# Patient Record
Sex: FEMALE | Race: WHITE | Employment: FULL TIME | URBAN - METROPOLITAN AREA
[De-identification: names, ages, dates, MRNs, and addresses within clinical notes are randomized per-mention and may not be internally consistent; named-entity substitution may affect disease eponyms.]

---

## 2018-11-30 ENCOUNTER — HOSPITAL ENCOUNTER (EMERGENCY)
Age: 63
Discharge: HOME | End: 2018-11-30
Attending: EMERGENCY MEDICINE
Payer: COMMERCIAL

## 2018-11-30 ENCOUNTER — APPOINTMENT (OUTPATIENT)
Dept: CT IMAGING | Age: 63
End: 2018-11-30
Payer: COMMERCIAL

## 2018-11-30 VITALS
SYSTOLIC BLOOD PRESSURE: 165 MMHG | RESPIRATION RATE: 18 BRPM | OXYGEN SATURATION: 98 % | DIASTOLIC BLOOD PRESSURE: 80 MMHG | BODY MASS INDEX: 31.28 KG/M2 | HEIGHT: 62 IN | HEART RATE: 90 BPM | TEMPERATURE: 97.4 F | WEIGHT: 170 LBS

## 2018-11-30 DIAGNOSIS — N10 ACUTE PYELONEPHRITIS: Primary | ICD-10-CM

## 2018-11-30 LAB
A/G RATIO: 1.3 (ref 1.1–2.2)
ALBUMIN SERPL-MCNC: 4.2 G/DL (ref 3.4–5)
ALP BLD-CCNC: 85 U/L (ref 40–129)
ALT SERPL-CCNC: 16 U/L (ref 10–40)
ANION GAP SERPL CALCULATED.3IONS-SCNC: 10 MMOL/L (ref 3–16)
AST SERPL-CCNC: 21 U/L (ref 15–37)
BACTERIA: ABNORMAL /HPF
BASOPHILS ABSOLUTE: 0.1 K/UL (ref 0–0.2)
BASOPHILS RELATIVE PERCENT: 1 %
BILIRUB SERPL-MCNC: 0.4 MG/DL (ref 0–1)
BILIRUBIN URINE: NEGATIVE
BLOOD, URINE: ABNORMAL
BUN BLDV-MCNC: 20 MG/DL (ref 7–20)
CALCIUM SERPL-MCNC: 9.8 MG/DL (ref 8.3–10.6)
CHLORIDE BLD-SCNC: 99 MMOL/L (ref 99–110)
CLARITY: CLEAR
CO2: 27 MMOL/L (ref 21–32)
COLOR: YELLOW
CREAT SERPL-MCNC: 0.6 MG/DL (ref 0.6–1.2)
EOSINOPHILS ABSOLUTE: 0.1 K/UL (ref 0–0.6)
EOSINOPHILS RELATIVE PERCENT: 1 %
EPITHELIAL CELLS, UA: ABNORMAL /HPF
GFR AFRICAN AMERICAN: >60
GFR NON-AFRICAN AMERICAN: >60
GLOBULIN: 3.3 G/DL
GLUCOSE BLD-MCNC: 97 MG/DL (ref 70–99)
GLUCOSE URINE: NEGATIVE MG/DL
HCT VFR BLD CALC: 41.9 % (ref 36–48)
HEMOGLOBIN: 14.2 G/DL (ref 12–16)
KETONES, URINE: NEGATIVE MG/DL
LEUKOCYTE ESTERASE, URINE: NEGATIVE
LIPASE: 34 U/L (ref 13–60)
LYMPHOCYTES ABSOLUTE: 1.3 K/UL (ref 1–5.1)
LYMPHOCYTES RELATIVE PERCENT: 11.6 %
MCH RBC QN AUTO: 29.9 PG (ref 26–34)
MCHC RBC AUTO-ENTMCNC: 33.9 G/DL (ref 31–36)
MCV RBC AUTO: 88.2 FL (ref 80–100)
MICROSCOPIC EXAMINATION: YES
MONOCYTES ABSOLUTE: 0.7 K/UL (ref 0–1.3)
MONOCYTES RELATIVE PERCENT: 6.2 %
MUCUS: ABNORMAL /LPF
NEUTROPHILS ABSOLUTE: 8.7 K/UL (ref 1.7–7.7)
NEUTROPHILS RELATIVE PERCENT: 80.2 %
NITRITE, URINE: NEGATIVE
PDW BLD-RTO: 12.9 % (ref 12.4–15.4)
PH UA: 6
PLATELET # BLD: 270 K/UL (ref 135–450)
PMV BLD AUTO: 7.6 FL (ref 5–10.5)
POTASSIUM REFLEX MAGNESIUM: 4.3 MMOL/L (ref 3.5–5.1)
PROTEIN UA: NEGATIVE MG/DL
RBC # BLD: 4.76 M/UL (ref 4–5.2)
RBC UA: ABNORMAL /HPF (ref 0–2)
SODIUM BLD-SCNC: 136 MMOL/L (ref 136–145)
SPECIFIC GRAVITY UA: >=1.03
TOTAL PROTEIN: 7.5 G/DL (ref 6.4–8.2)
URINE REFLEX TO CULTURE: ABNORMAL
URINE TYPE: ABNORMAL
UROBILINOGEN, URINE: 0.2 E.U./DL
WBC # BLD: 10.8 K/UL (ref 4–11)
WBC UA: ABNORMAL /HPF (ref 0–5)

## 2018-11-30 PROCEDURE — 99284 EMERGENCY DEPT VISIT MOD MDM: CPT

## 2018-11-30 PROCEDURE — 81001 URINALYSIS AUTO W/SCOPE: CPT

## 2018-11-30 PROCEDURE — 74176 CT ABD & PELVIS W/O CONTRAST: CPT

## 2018-11-30 PROCEDURE — 83690 ASSAY OF LIPASE: CPT

## 2018-11-30 PROCEDURE — 80053 COMPREHEN METABOLIC PANEL: CPT

## 2018-11-30 PROCEDURE — 85025 COMPLETE CBC W/AUTO DIFF WBC: CPT

## 2018-11-30 RX ORDER — PHENAZOPYRIDINE HYDROCHLORIDE 200 MG/1
200 TABLET, FILM COATED ORAL 3 TIMES DAILY PRN
Qty: 6 TABLET | Refills: 0 | Status: SHIPPED | OUTPATIENT
Start: 2018-11-30 | End: 2018-12-03

## 2018-11-30 ASSESSMENT — PAIN DESCRIPTION - LOCATION: LOCATION: BACK

## 2018-11-30 ASSESSMENT — PAIN DESCRIPTION - FREQUENCY: FREQUENCY: CONTINUOUS

## 2018-11-30 ASSESSMENT — PAIN SCALES - GENERAL: PAINLEVEL_OUTOF10: 7

## 2018-11-30 ASSESSMENT — PAIN DESCRIPTION - PAIN TYPE: TYPE: ACUTE PAIN

## 2018-11-30 ASSESSMENT — PAIN DESCRIPTION - DESCRIPTORS: DESCRIPTORS: SHARP

## 2018-11-30 ASSESSMENT — PAIN DESCRIPTION - ONSET: ONSET: ON-GOING

## 2018-11-30 ASSESSMENT — ENCOUNTER SYMPTOMS: GASTROINTESTINAL NEGATIVE: 1

## 2018-12-01 RX ORDER — LEVOFLOXACIN 750 MG/1
750 TABLET ORAL DAILY
Qty: 5 TABLET | Refills: 0 | Status: SHIPPED | OUTPATIENT
Start: 2018-12-01 | End: 2018-12-06

## 2018-12-01 NOTE — ED PROVIDER NOTES
[address] ED  eMERGENCY dEPARTMENT eNCOUnter        Pt Name: Karen Denise  MRN: [id number]  [place] 1955  Date of evaluation: 11/30/2018  Provider: Jax Campos PA-C  PCP: No primary care provider on file. This patient was seen and evaluated by the attending physician by Dr. Lorelei Garvin. CHIEF COMPLAINT       Chief Complaint   Patient presents with    Urinary Tract Infection     patient states that she has burning with urination and back pain that started yesturday evening. patient states that she was started on Augmentin yesturday and has taken 3 doses       HISTORY OF PRESENT ILLNESS   (Location/Symptom, Timing/Onset, Context/Setting, Quality, Duration, Modifying Factors, Severity)  Note limiting factors. Karen Denise is a 70 y.o. female presents with a several day history of dysuria and lower abdominal pain with some lower back pain as well. . Diagnosed with UTI and started on Augmentin several days ago however patient still complaining of dysuria and lower back pain. Onset couple of days ago. Duration symptoms have been persistent since onset. No aggravating or alleviating symptoms. Denies fevers or chills at this time. Nursing Notes were all reviewed and agreed with or any disagreements were addressed  in the HPI. REVIEW OF SYSTEMS    (2-9 systems for level 4, 10 or more for level 5)     Review of Systems   Constitutional: Negative. HENT: Negative. Cardiovascular: Negative. Gastrointestinal: Negative. Genitourinary: Positive for dysuria. Musculoskeletal: Negative. Neurological: Negative. Hematological: Negative. Positives and Pertinent negatives as per HPI. Except as noted abovein the ROS, all other systems were reviewed and negative. PAST MEDICAL HISTORY     Past Medical History:   Diagnosis Date    Hypertension          SURGICAL HISTORY   History reviewed. No pertinent surgical history.       [address] placed or performed during the hospital encounter of 11/30/18   Urinalysis Reflex to Culture   Result Value Ref Range    Color, UA Yellow Straw/Yellow    Clarity, UA Clear Clear    Glucose, Ur Negative Negative mg/dL    Bilirubin Urine Negative Negative    Ketones, Urine Negative Negative mg/dL    Specific Gravity, UA >=1.030 1.005 - 1.030    Blood, Urine SMALL (A) Negative    pH, UA 6.0 5.0 - 8.0    Protein, UA Negative Negative mg/dL    Urobilinogen, Urine 0.2 <2.0 E.U./dL    Nitrite, Urine Negative Negative    Leukocyte Esterase, Urine Negative Negative    Microscopic Examination YES     Urine Reflex to Culture Not Indicated     Urine Type Not Specified    CBC auto differential   Result Value Ref Range    WBC 10.8 4.0 - 11.0 K/uL    RBC 4.76 4.00 - 5.20 M/uL    Hemoglobin 14.2 12.0 - 16.0 g/dL    Hematocrit 41.9 36.0 - 48.0 %    MCV 88.2 80.0 - 100.0 fL    MCH 29.9 26.0 - 34.0 pg    MCHC 33.9 31.0 - 36.0 g/dL    RDW 12.9 12.4 - 15.4 %    Platelets 072 526 - 645 K/uL    MPV 7.6 5.0 - 10.5 fL    Neutrophils % 80.2 %    Lymphocytes % 11.6 %    Monocytes % 6.2 %    Eosinophils % 1.0 %    Basophils % 1.0 %    Neutrophils # 8.7 (H) 1.7 - 7.7 K/uL    Lymphocytes # 1.3 1.0 - 5.1 K/uL    Monocytes # 0.7 0.0 - 1.3 K/uL    Eosinophils # 0.1 0.0 - 0.6 K/uL    Basophils # 0.1 0.0 - 0.2 K/uL   Comprehensive Metabolic Panel w/ Reflex to MG   Result Value Ref Range    Sodium 136 136 - 145 mmol/L    Potassium reflex Magnesium 4.3 3.5 - 5.1 mmol/L    Chloride 99 99 - 110 mmol/L    CO2 27 21 - 32 mmol/L    Anion Gap 10 3 - 16    Glucose 97 70 - 99 mg/dL    BUN 20 7 - 20 mg/dL    CREATININE 0.6 0.6 - 1.2 mg/dL    GFR Non-African American >60 >60    GFR African American >60 >60    Calcium 9.8 8.3 - 10.6 mg/dL    Total Protein 7.5 6.4 - 8.2 g/dL    Alb 4.2 3.4 - 5.0 g/dL    Albumin/Globulin Ratio 1.3 1.1 - 2.2    Total Bilirubin 0.4 0.0 - 1.0 mg/dL    Alkaline Phosphatase 85 40 - 129 U/L    ALT 16 10 - 40 U/L    AST 21 15 - 37 U/L Globulin 3.3 g/dL   Lipase   Result Value Ref Range    Lipase 34.0 13.0 - 60.0 U/L   Microscopic Urinalysis   Result Value Ref Range    Mucus, UA 1+ (A) /LPF    WBC, UA 3-5 0 - 5 /HPF    RBC, UA 10-20 (A) 0 - 2 /HPF    Epi Cells 0-2 /HPF    Bacteria, UA Rare (A) /HPF         I estimate there is LOW risk for ACUTE APPENDICITIS, BOWEL OBSTRUCTION, CHOLECYSTITIS, DIVERTICULITIS, INCARCERATED HERNIA, PANCREATITIS, or PERFORATED BOWEL or ULCER, thus I consider the discharge disposition reasonable. Also, there is no evidence or peritonitis, sepsis, or toxicity. Erendira Lynch and I have discussed the diagnosis and risks, and we agree with discharging home to follow-up with their primary doctor. We also discussed returning to the Emergency Department immediately if new or worsening symptoms occur. We have discussed the symptoms which are most concerning (e.g., bloody stool, fever, changing or worsening pain, vomiting) that necessitate immediate return. FINAL Impression    1. Acute pyelonephritis        Blood pressure (!) 165/80, pulse 90, temperature 97.4 °F (36.3 °C), temperature source Temporal, resp. rate 18, height 5' 2\" (1.575 m), weight 170 lb (77.1 kg), SpO2 98 %. FINAL IMPRESSION      1.  Acute pyelonephritis          DISPOSITION/PLAN   DISPOSITION Decision To Discharge 11/30/2018 11:18:33 PM      PATIENT REFERREDTO:  [facility]) Pre-Services  [phone]          DISCHARGE MEDICATIONS:  Discharge Medication List as of 11/30/2018 11:19 PM      START taking these medications    Details   phenazopyridine (PYRIDIUM) 200 MG tablet Take 1 tablet by mouth 3 times daily as needed for Pain (bladder spasm/pain), Disp-6 tablet, R-0Print             DISCONTINUED MEDICATIONS:  Discharge Medication List as of 11/30/2018 11:19 PM                 (Please note that portions ofthis note were completed with a voice recognition program.  Efforts were made to edit the dictations but occasionally words are

## 2020-12-23 ENCOUNTER — APPOINTMENT (OUTPATIENT)
Dept: GENERAL RADIOLOGY | Age: 65
End: 2020-12-23
Payer: COMMERCIAL

## 2020-12-23 ENCOUNTER — HOSPITAL ENCOUNTER (EMERGENCY)
Age: 65
Discharge: HOME | End: 2020-12-23
Attending: EMERGENCY MEDICINE
Payer: COMMERCIAL

## 2020-12-23 VITALS
BODY MASS INDEX: 34.04 KG/M2 | DIASTOLIC BLOOD PRESSURE: 92 MMHG | TEMPERATURE: 98.3 F | HEART RATE: 74 BPM | SYSTOLIC BLOOD PRESSURE: 173 MMHG | WEIGHT: 185 LBS | RESPIRATION RATE: 16 BRPM | OXYGEN SATURATION: 98 % | HEIGHT: 62 IN

## 2020-12-23 PROCEDURE — 73130 X-RAY EXAM OF HAND: CPT

## 2020-12-23 PROCEDURE — 73090 X-RAY EXAM OF FOREARM: CPT

## 2020-12-23 PROCEDURE — 99284 EMERGENCY DEPT VISIT MOD MDM: CPT

## 2020-12-23 PROCEDURE — 73110 X-RAY EXAM OF WRIST: CPT

## 2020-12-23 RX ORDER — OXYCODONE HYDROCHLORIDE 5 MG/1
5 TABLET ORAL EVERY 6 HOURS PRN
Qty: 8 TABLET | Refills: 0 | Status: SHIPPED | OUTPATIENT
Start: 2020-12-23 | End: 2020-12-26

## 2020-12-23 ASSESSMENT — PAIN SCALES - GENERAL
PAINLEVEL_OUTOF10: 8
PAINLEVEL_OUTOF10: 8

## 2020-12-23 ASSESSMENT — PAIN DESCRIPTION - ORIENTATION: ORIENTATION: RIGHT

## 2020-12-23 ASSESSMENT — PAIN DESCRIPTION - PAIN TYPE: TYPE: ACUTE PAIN

## 2020-12-23 ASSESSMENT — PAIN DESCRIPTION - LOCATION: LOCATION: WRIST

## 2020-12-23 NOTE — ED PROVIDER NOTES
[facility]  ED PROVIDER NOTE    Patient Identification  Pt Name: Augusto Simons  MRN: [id number]  [place] 1955  Date of evaluation: 12/23/2020  Provider: Shannon Lewis MD  PCP: No primary care provider on file. Chief Complaint  Wrist Pain (patient states falling at work, c/o pain in right wrist. Ibuprofen 600mg PO taken before arrival. ) and Fall (states she slipped on the floor)      HPI  History provided by patient   This is a 77 y.o. female who presents to the ED for left wrist pain. Patient states that she slipped on the floor and fell out onto her arm. No pain anywhere but her right wrist.  Denies pain in her elbow, shoulder, chest, abdomen, head, neck, back, legs, left arm. No numbness or tingling. Pain is moderate intensity worse with movement. ROS  10 systems reviewed, pertinent positives/negatives per HPI otherwise noted to be negative. I have reviewed the following nursing documentation:  Allergies: Patient has no known allergies. Past medical history:   Past Medical History:   Diagnosis Date    Hypertension      Past surgical history: History reviewed. No pertinent surgical history. Home medications:   Previous Medications    METOPROLOL (LOPRESSOR) 50 MG TABLET    Take 50 mg by mouth 2 times daily. Social history:  reports that she quit smoking about 6 years ago. She has never used smokeless tobacco. She reports that she does not drink alcohol or use drugs. Family history:  History reviewed. No pertinent family history. Exam  ED Triage Vitals   BP Temp Temp src Pulse Resp SpO2 Height Weight   -- -- -- 12/23/20 0817 -- 12/23/20 0817 12/23/20 0832 12/23/20 0832      86  96 % 5' 2\" (1.575 m) 185 lb (83.9 kg)     Nursing note and vitals reviewed. Constitutional: In no acute distress  HENT:      Head: Normocephalic      Ears: External ears normal.      Nose: Nose normal.     Mouth: Membrane mucosa moist   Eyes: No discharge. Neck: Supple.  Trachea midline. Cardiovascular: Regular rate. Warm extremities  Pulmonary/Chest: Effort normal. No respiratory distress. CTAB. Abdominal: Soft. No distension. Nontender  : Deferred  Rectal: Deferred   Musculoskeletal: Moves all extremities. No gross deformity. No open wound. Right distal ulna pain. Normal capillary refill right hand. Normal sensation light palpation. Neurological: Alert and oriented. Face symmetric. Speech is clear. Skin: Warm and dry. No rash. Psychiatric: Normal mood and affect. Behavior is normal.    Procedures        Radiology  XR HAND RIGHT (MIN 3 VIEWS)   Final Result   RIGHT FOREARM:      Acute fractures of the distal right radius and ulna, as detailed on the wrist   impression. RIGHT WRIST:      Acute mildly displaced fracture of the distal right radius. There is also an   acute nondisplaced fracture of the ulnar styloid. RIGHT HAND:      No acute abnormality of the right hand. XR RADIUS ULNA RIGHT (2 VIEWS)   Final Result   RIGHT FOREARM:      Acute fractures of the distal right radius and ulna, as detailed on the wrist   impression. RIGHT WRIST:      Acute mildly displaced fracture of the distal right radius. There is also an   acute nondisplaced fracture of the ulnar styloid. RIGHT HAND:      No acute abnormality of the right hand. XR WRIST RIGHT (MIN 3 VIEWS)   Final Result   RIGHT FOREARM:      Acute fractures of the distal right radius and ulna, as detailed on the wrist   impression. RIGHT WRIST:      Acute mildly displaced fracture of the distal right radius. There is also an   acute nondisplaced fracture of the ulnar styloid. RIGHT HAND:      No acute abnormality of the right hand. Labs  No results found for this visit on 12/23/20.     Screenings   Chivo Coma Scale  Eye Opening: Spontaneous  Best Verbal Response: Oriented  Best Motor Response: Obeys commands  Chivo Coma Scale Score: 15       MDM and ED Course  Patient is a 13-year-old woman who presents with mechanical fall and right wrist pain. Will obtain x-rays. Neurovascularly intact at this time (EMP MDM)    Patient found to have radial and ulnar bone fracture. Splinted. Neurovascularly intact after splinting. Patient was reassessed. They are feeling well. Objectively they appear to be in no acute distress. Discharge instructions, follow up instructions, and return precautions were discussed with the patient and any available family. All questions were answered. [unfilled]    Final Impression  1. Closed fracture of right wrist, initial encounter        Blood pressure (!) 192/101, pulse 86, resp. rate 16, height 5' 2\" (1.575 m), weight 185 lb (83.9 kg), SpO2 95 %. Disposition:  DISPOSITION Discharge - Pending Orders Complete 12/23/2020 09:12:33 AM      Patient Referrals:  Ray Cohen MD  [id number]  [place] [address]  [phone]            Discharge Medications:  New Prescriptions    OXYCODONE (ROXICODONE) 5 MG IMMEDIATE RELEASE TABLET    Take 1 tablet by mouth every 6 hours as needed for Pain for up to 3 days. Intended supply: 3 days. Take lowest dose possible to manage pain       Discontinued Medications:  Discontinued Medications    No medications on file       This chart was generated using the [address] [place] [address] [address] dictation system. I created this record but it may contain dictation errors given the limitations of this technology.         Sarah Blackmon MD  12/23/20 0979

## 2020-12-23 NOTE — ED NOTES
Consent for ring removal obtained and witnessed by TONY Recinos.       Miguelito Au RN  12/23/20 7428

## 2020-12-23 NOTE — ED NOTES

## 2020-12-28 ENCOUNTER — OFFICE VISIT (OUTPATIENT)
Dept: ORTHOPEDIC SURGERY | Age: 65
End: 2020-12-28
Payer: COMMERCIAL

## 2020-12-28 VITALS — WEIGHT: 185 LBS | BODY MASS INDEX: 34.04 KG/M2 | HEIGHT: 62 IN

## 2020-12-28 PROBLEM — S52.501A CLOSED FRACTURE OF RIGHT DISTAL RADIUS: Status: ACTIVE | Noted: 2020-12-28

## 2020-12-28 PROCEDURE — 99203 OFFICE O/P NEW LOW 30 MIN: CPT | Performed by: ORTHOPAEDIC SURGERY

## 2020-12-28 PROCEDURE — 29125 APPL SHORT ARM SPLINT STATIC: CPT | Performed by: ORTHOPAEDIC SURGERY

## 2020-12-28 RX ORDER — OXYCODONE HYDROCHLORIDE AND ACETAMINOPHEN 5; 325 MG/1; MG/1
1 TABLET ORAL EVERY 8 HOURS PRN
Qty: 21 TABLET | Refills: 0 | Status: SHIPPED | OUTPATIENT
Start: 2020-12-28 | End: 2021-01-04

## 2020-12-28 NOTE — PROGRESS NOTES
Chief Complaint  Wrist Pain (right wrist INJURY FELL AT WORK ON 2020)      HPI:  The patient is a 77 y.o. 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 Patient slipped and fell at work, bracing her fall with her right wrist, she felt a pop and had immediate pain followed by swelling and bruising. she was seen for Emergency evaluation elsewhere where radiographs were obtained & she was appropriately immobilized. By report, there was not an associated skin injury. she reports moderate throbbing pain located in the dorsal area of the distal forearm & wrist, no tenderness throughout the hand or elbow. Pt does not have numbness in the hand. Medical History:  Past Medical History:   Diagnosis Date    Hypertension      No past surgical history on file. No family history on file.   Social History     Socioeconomic History    Marital status:      Spouse name: None    Number of children: None    Years of education: None    Highest education level: None   Occupational History    None   Social Needs    Financial resource strain: None    Food insecurity     Worry: None     Inability: None    Transportation needs     Medical: None     Non-medical: None   Tobacco Use    Smoking status: Former Smoker     Quit date: 2014     Years since quittin.0    Smokeless tobacco: Never Used   Substance and Sexual Activity    Alcohol use: No    Drug use: No    Sexual activity: None   Lifestyle    Physical activity     Days per week: None     Minutes per session: None    Stress: None   Relationships    Social connections     Talks on phone: None     Gets together: None     Attends Buddhism service: None     Active member of club or organization: None     Attends meetings of clubs or organizations: None     Relationship status: None    Intimate partner violence     Fear of current or ex partner: None Emotionally abused: None     Physically abused: None     Forced sexual activity: None   Other Topics Concern    None   Social History Narrative    None     Current Outpatient Medications   Medication Sig Dispense Refill    metoprolol (LOPRESSOR) 50 MG tablet Take 50 mg by mouth 2 times daily. No current facility-administered medications for this visit. No Known Allergies    ROS:  ROS neg except for positives in HPI    Physical Exam:   GEN/PSYCH: The patient is well nourished, oriented to person, place & time. The patient demonstrates appropriate mood and affect as well as normal gait and station. Pain level appropriate to injury. Skin: Skin color, texture, turgor normal. No rashes or lesions in the injured limb, normal on the contralateral side     Digits:   range of motion is satisfactory bilaterally, mildly diminished secondary to pain on affected side     right wrist:   Range of motion is limited by pain. Swelling is moderate in the wrist & digits. Maximal pain is elicited with palpation of proximal wrist with moderately tender distal ulna/ulnar styloid  Sensation is  subjectively normal in the entire hand. There is  clinical suggestion of mal-alignment of distal radius. No evidence of gross joint instability, excluding the immediate zone of injury, bilaterally. Muscular strength is clinically appropriate, limited by pain in the injured extremity. Skin intact and compartments soft. Volar ecchymosis noted    Contralateral wrist:  Full range of motion . No swelling. No pain to palpation of wrist.  Sensation is subjectively normal in the Whole Hand, no mal-alignment of distal radius.   No evidence of gross joint instability,  Muscular strength is clinically appropriate     Vascular examination: satisfactory bilaterally       Radiographic Evaluation:   Radiographs are reviewed today:  EXAMINATION:   TWO XRAY VIEWS OF THE RIGHT FOREARM; THREE XRAY VIEWS OF THE RIGHT HAND; THREE XRAY VIEWS OF THE RIGHT WRIST       12/23/2020 8:58 am       COMPARISON:   None.       HISTORY:   ORDERING SYSTEM PROVIDED HISTORY: right distal ulna pain   TECHNOLOGIST PROVIDED HISTORY:   Reason for exam:->right distal ulna pain       Patient fell at work, acute right wrist pain.       FINDINGS:   RIGHT FOREARM:       Frontal and lateral views of the right forearm were performed. Merrick Spatz are   acute fractures at the right wrist, as detailed below. Merrick Spatz is no   additional fracture of the shafts of the right radius or ulna.  The right   elbow joint appears preserved.  There is a small enthesophyte adjacent to the   lateral epicondyle of the distal right humerus.  No soft tissue abnormality   or radiopaque foreign body is evident.       RIGHT WRIST:       Three views of the right wrist were performed. Merrick Spatz is an acute displaced   fracture of the distal right radius, with mild dorsal displacement of the   distal fracture fragment.  There is also an acute nondisplaced ulnar styloid   avulsion fracture.  No additional fracture is identified.  There is joint   spaces are relatively preserved.  There is diffuse soft tissue swelling.       RIGHT HAND:       Three views of the right hand were performed. Merrick Spatz is no acute fracture or   dislocation.  There is mild multifocal osteoarthritis.  There is mild diffuse   soft tissue swelling of the right hand.  No radiopaque foreign body is   evident.           Impression   RIGHT FOREARM:       Acute fractures of the distal right radius and ulna, as detailed on the wrist   impression.       RIGHT WRIST:       Acute mildly displaced fracture of the distal right radius.  There is also an   acute nondisplaced fracture of the ulnar styloid.       RIGHT HAND:       No acute abnormality of the right hand.          Impression:   Right distal radius and ulna fracture    Plan: I talked at length with the patient regarding treatment options, both surgical and nonsurgical.  We talked about the implication of the fracture alignment and involvement and the chance of stiffness and post-traumatic arthritis. Patient agrees with the plan and all questions with the patient are answered. We discussed proceeding with surgical stabilization of the right distal radius with a volar plate by means of ORIF. The distal ulna can be treated nonoperatively. This would be an outpatient procedure under anesthesia and be done this week pending clearance and approval by [facility]. We will transition to a short splint today, allowing the elbow to be free. Nonweightbearing status, good elevation, icing. Pain medication was refilled. We will help arrange surgical scheduling for this. The risks and benefits of surgical fixation versus non-operative management were discussed thoroughly. These included, but were not limited to infection, tendon or nerve injury, stiffness or pain of the wrist joint long-term, malunion, nonunion, implant failure, tendon rupture, scar sensitivity, adverse effects of anesthesia (stroke or death), and possible need for hardware removal.    All questions and concerns were addressed today. Patient is in agreement with the plan. Abbi Painter MD  Hand & Upper Extremity Surgery  [address]  A partner of [facility] ([facility])        Please note that this transcription was created using voice recognition software. Any errors are unintentional and may be due to voice recognition transcription.

## 2020-12-29 ENCOUNTER — ANESTHESIA EVENT (OUTPATIENT)
Dept: OPERATING ROOM | Age: 65
End: 2020-12-29
Payer: COMMERCIAL

## 2020-12-29 ENCOUNTER — HOSPITAL ENCOUNTER (OUTPATIENT)
Age: 65
Discharge: HOME | End: 2020-12-29
Payer: COMMERCIAL

## 2020-12-29 LAB
ANION GAP SERPL CALCULATED.3IONS-SCNC: 8 MMOL/L (ref 3–16)
BASOPHILS ABSOLUTE: 0.1 K/UL (ref 0–0.2)
BASOPHILS RELATIVE PERCENT: 1.4 %
BUN BLDV-MCNC: 18 MG/DL (ref 7–20)
CALCIUM SERPL-MCNC: 9.7 MG/DL (ref 8.3–10.6)
CHLORIDE BLD-SCNC: 102 MMOL/L (ref 99–110)
CO2: 31 MMOL/L (ref 21–32)
CREAT SERPL-MCNC: 0.7 MG/DL (ref 0.6–1.2)
EOSINOPHILS ABSOLUTE: 0.2 K/UL (ref 0–0.6)
EOSINOPHILS RELATIVE PERCENT: 2.2 %
GFR AFRICAN AMERICAN: >60
GFR NON-AFRICAN AMERICAN: >60
GLUCOSE BLD-MCNC: 119 MG/DL (ref 70–99)
HCT VFR BLD CALC: 40.5 % (ref 36–48)
HEMOGLOBIN: 13.5 G/DL (ref 12–16)
LYMPHOCYTES ABSOLUTE: 2.2 K/UL (ref 1–5.1)
LYMPHOCYTES RELATIVE PERCENT: 22.8 %
MCH RBC QN AUTO: 29.4 PG (ref 26–34)
MCHC RBC AUTO-ENTMCNC: 33.4 G/DL (ref 31–36)
MCV RBC AUTO: 88.1 FL (ref 80–100)
MONOCYTES ABSOLUTE: 0.7 K/UL (ref 0–1.3)
MONOCYTES RELATIVE PERCENT: 7 %
NEUTROPHILS ABSOLUTE: 6.4 K/UL (ref 1.7–7.7)
NEUTROPHILS RELATIVE PERCENT: 66.6 %
PDW BLD-RTO: 13.1 % (ref 12.4–15.4)
PLATELET # BLD: 327 K/UL (ref 135–450)
PMV BLD AUTO: 6.6 FL (ref 5–10.5)
POTASSIUM SERPL-SCNC: 4.4 MMOL/L (ref 3.5–5.1)
RBC # BLD: 4.6 M/UL (ref 4–5.2)
SARS-COV-2, NAAT: NOT DETECTED
SODIUM BLD-SCNC: 141 MMOL/L (ref 136–145)
WBC # BLD: 9.6 K/UL (ref 4–11)

## 2020-12-29 PROCEDURE — 80048 BASIC METABOLIC PNL TOTAL CA: CPT

## 2020-12-29 PROCEDURE — U0002 COVID-19 LAB TEST NON-CDC: HCPCS

## 2020-12-29 PROCEDURE — 36415 COLL VENOUS BLD VENIPUNCTURE: CPT

## 2020-12-29 PROCEDURE — 85025 COMPLETE CBC W/AUTO DIFF WBC: CPT

## 2020-12-30 ENCOUNTER — HOSPITAL ENCOUNTER (OUTPATIENT)
Age: 65
Setting detail: OUTPATIENT SURGERY
Discharge: HOME | End: 2020-12-30
Attending: ORTHOPAEDIC SURGERY | Admitting: ORTHOPAEDIC SURGERY
Payer: COMMERCIAL

## 2020-12-30 ENCOUNTER — ANESTHESIA (OUTPATIENT)
Dept: OPERATING ROOM | Age: 65
End: 2020-12-30
Payer: COMMERCIAL

## 2020-12-30 VITALS
WEIGHT: 190 LBS | DIASTOLIC BLOOD PRESSURE: 53 MMHG | HEART RATE: 96 BPM | SYSTOLIC BLOOD PRESSURE: 157 MMHG | RESPIRATION RATE: 18 BRPM | OXYGEN SATURATION: 94 % | BODY MASS INDEX: 34.96 KG/M2 | HEIGHT: 62 IN | TEMPERATURE: 97.6 F

## 2020-12-30 VITALS — DIASTOLIC BLOOD PRESSURE: 61 MMHG | OXYGEN SATURATION: 91 % | SYSTOLIC BLOOD PRESSURE: 118 MMHG

## 2020-12-30 PROCEDURE — 7100000011 HC PHASE II RECOVERY - ADDTL 15 MIN: Performed by: ORTHOPAEDIC SURGERY

## 2020-12-30 PROCEDURE — 3600000004 HC SURGERY LEVEL 4 BASE: Performed by: ORTHOPAEDIC SURGERY

## 2020-12-30 PROCEDURE — 3700000000 HC ANESTHESIA ATTENDED CARE: Performed by: ORTHOPAEDIC SURGERY

## 2020-12-30 PROCEDURE — 2709999900 HC NON-CHARGEABLE SUPPLY: Performed by: ORTHOPAEDIC SURGERY

## 2020-12-30 PROCEDURE — 2500000003 HC RX 250 WO HCPCS: Performed by: NURSE ANESTHETIST, CERTIFIED REGISTERED

## 2020-12-30 PROCEDURE — 2580000003 HC RX 258: Performed by: ANESTHESIOLOGY

## 2020-12-30 PROCEDURE — C1713 ANCHOR/SCREW BN/BN,TIS/BN: HCPCS | Performed by: ORTHOPAEDIC SURGERY

## 2020-12-30 PROCEDURE — 6360000002 HC RX W HCPCS: Performed by: NURSE ANESTHETIST, CERTIFIED REGISTERED

## 2020-12-30 PROCEDURE — 2500000003 HC RX 250 WO HCPCS: Performed by: ANESTHESIOLOGY

## 2020-12-30 PROCEDURE — 3700000001 HC ADD 15 MINUTES (ANESTHESIA): Performed by: ORTHOPAEDIC SURGERY

## 2020-12-30 PROCEDURE — 7100000010 HC PHASE II RECOVERY - FIRST 15 MIN: Performed by: ORTHOPAEDIC SURGERY

## 2020-12-30 PROCEDURE — 6360000002 HC RX W HCPCS: Performed by: ORTHOPAEDIC SURGERY

## 2020-12-30 PROCEDURE — 64415 NJX AA&/STRD BRCH PLXS IMG: CPT | Performed by: ANESTHESIOLOGY

## 2020-12-30 PROCEDURE — 3600000014 HC SURGERY LEVEL 4 ADDTL 15MIN: Performed by: ORTHOPAEDIC SURGERY

## 2020-12-30 DEVICE — SCREW BNE L15MM DIA2.7MM DST RAD NONLOCKING FULL THRD SQ: Type: IMPLANTABLE DEVICE | Site: WRIST | Status: FUNCTIONAL

## 2020-12-30 DEVICE — SCREW BNE L14MM DIA2.7MM DST VOLAR RAD NONLOCKING FULL THRD: Type: IMPLANTABLE DEVICE | Site: WRIST | Status: FUNCTIONAL

## 2020-12-30 DEVICE — SCREW BNE L18MM DIA2.7MM DST VOLAR RAD MULTDIR FULL THRD SQ: Type: IMPLANTABLE DEVICE | Site: WRIST | Status: FUNCTIONAL

## 2020-12-30 DEVICE — PLATE BONE NONSTERILE RT VOLAR NAR CROSSLOCK DVR: Type: IMPLANTABLE DEVICE | Site: WRIST | Status: FUNCTIONAL

## 2020-12-30 DEVICE — SCREW BNE L18MM DIA2.7MM DST RAD LOK FULL THRD SQ DRV HD LO: Type: IMPLANTABLE DEVICE | Site: WRIST | Status: FUNCTIONAL

## 2020-12-30 DEVICE — IMPLANTABLE DEVICE: Type: IMPLANTABLE DEVICE | Site: WRIST | Status: FUNCTIONAL

## 2020-12-30 DEVICE — SCREW BNE L16MM DIA2.7MM CORT DST RAD LOK FULL THRD SQ DRV: Type: IMPLANTABLE DEVICE | Site: WRIST | Status: FUNCTIONAL

## 2020-12-30 DEVICE — SCREW BONE L15MM DIA2.7MM DSTL RAD LCK FULL THRD SQ DRV HD: Type: IMPLANTABLE DEVICE | Site: WRIST | Status: FUNCTIONAL

## 2020-12-30 DEVICE — SCREW BNE L16MM DIA2.7MM DST RAD LOK FULL THRD SQ DRV HD LO: Type: IMPLANTABLE DEVICE | Site: WRIST | Status: FUNCTIONAL

## 2020-12-30 RX ORDER — SODIUM CHLORIDE 0.9 % (FLUSH) 0.9 %
10 SYRINGE (ML) INJECTION PRN
Status: DISCONTINUED | OUTPATIENT
Start: 2020-12-30 | End: 2020-12-30 | Stop reason: HOSPADM

## 2020-12-30 RX ORDER — SODIUM CHLORIDE, SODIUM LACTATE, POTASSIUM CHLORIDE, CALCIUM CHLORIDE 600; 310; 30; 20 MG/100ML; MG/100ML; MG/100ML; MG/100ML
INJECTION, SOLUTION INTRAVENOUS CONTINUOUS
Status: DISCONTINUED | OUTPATIENT
Start: 2020-12-30 | End: 2020-12-30 | Stop reason: HOSPADM

## 2020-12-30 RX ORDER — PROPOFOL 10 MG/ML
INJECTION, EMULSION INTRAVENOUS PRN
Status: DISCONTINUED | OUTPATIENT
Start: 2020-12-30 | End: 2020-12-30 | Stop reason: SDUPTHER

## 2020-12-30 RX ORDER — OXYCODONE HYDROCHLORIDE AND ACETAMINOPHEN 5; 325 MG/1; MG/1
2 TABLET ORAL PRN
Status: DISCONTINUED | OUTPATIENT
Start: 2020-12-30 | End: 2020-12-30 | Stop reason: HOSPADM

## 2020-12-30 RX ORDER — SODIUM CHLORIDE 0.9 % (FLUSH) 0.9 %
10 SYRINGE (ML) INJECTION EVERY 12 HOURS SCHEDULED
Status: DISCONTINUED | OUTPATIENT
Start: 2020-12-30 | End: 2020-12-30 | Stop reason: HOSPADM

## 2020-12-30 RX ORDER — FENTANYL CITRATE 50 UG/ML
INJECTION, SOLUTION INTRAMUSCULAR; INTRAVENOUS PRN
Status: DISCONTINUED | OUTPATIENT
Start: 2020-12-30 | End: 2020-12-30 | Stop reason: SDUPTHER

## 2020-12-30 RX ORDER — HYDRALAZINE HYDROCHLORIDE 20 MG/ML
5 INJECTION INTRAMUSCULAR; INTRAVENOUS EVERY 30 MIN PRN
Status: DISCONTINUED | OUTPATIENT
Start: 2020-12-30 | End: 2020-12-30 | Stop reason: HOSPADM

## 2020-12-30 RX ORDER — ONDANSETRON 2 MG/ML
INJECTION INTRAMUSCULAR; INTRAVENOUS PRN
Status: DISCONTINUED | OUTPATIENT
Start: 2020-12-30 | End: 2020-12-30 | Stop reason: SDUPTHER

## 2020-12-30 RX ORDER — LABETALOL HYDROCHLORIDE 5 MG/ML
5 INJECTION, SOLUTION INTRAVENOUS
Status: DISCONTINUED | OUTPATIENT
Start: 2020-12-30 | End: 2020-12-30 | Stop reason: HOSPADM

## 2020-12-30 RX ORDER — LIDOCAINE HYDROCHLORIDE 20 MG/ML
INJECTION, SOLUTION INFILTRATION; PERINEURAL PRN
Status: DISCONTINUED | OUTPATIENT
Start: 2020-12-30 | End: 2020-12-30 | Stop reason: SDUPTHER

## 2020-12-30 RX ORDER — ONDANSETRON 2 MG/ML
4 INJECTION INTRAMUSCULAR; INTRAVENOUS EVERY 30 MIN PRN
Status: DISCONTINUED | OUTPATIENT
Start: 2020-12-30 | End: 2020-12-30 | Stop reason: HOSPADM

## 2020-12-30 RX ORDER — BUPIVACAINE HYDROCHLORIDE 5 MG/ML
INJECTION, SOLUTION EPIDURAL; INTRACAUDAL
Status: DISPENSED
Start: 2020-12-30 | End: 2020-12-31

## 2020-12-30 RX ORDER — DIPHENHYDRAMINE HYDROCHLORIDE 50 MG/ML
6.25 INJECTION INTRAMUSCULAR; INTRAVENOUS
Status: DISCONTINUED | OUTPATIENT
Start: 2020-12-30 | End: 2020-12-30 | Stop reason: HOSPADM

## 2020-12-30 RX ORDER — OXYCODONE HYDROCHLORIDE AND ACETAMINOPHEN 5; 325 MG/1; MG/1
1 TABLET ORAL PRN
Status: DISCONTINUED | OUTPATIENT
Start: 2020-12-30 | End: 2020-12-30 | Stop reason: HOSPADM

## 2020-12-30 RX ORDER — DEXAMETHASONE SODIUM PHOSPHATE 4 MG/ML
INJECTION, SOLUTION INTRA-ARTICULAR; INTRALESIONAL; INTRAMUSCULAR; INTRAVENOUS; SOFT TISSUE PRN
Status: DISCONTINUED | OUTPATIENT
Start: 2020-12-30 | End: 2020-12-30 | Stop reason: SDUPTHER

## 2020-12-30 RX ADMIN — ONDANSETRON 4 MG: 2 INJECTION, SOLUTION INTRAMUSCULAR; INTRAVENOUS at 16:39

## 2020-12-30 RX ADMIN — PROPOFOL 25 MG: 10 INJECTION, EMULSION INTRAVENOUS at 17:08

## 2020-12-30 RX ADMIN — FENTANYL CITRATE 25 MCG: 50 INJECTION INTRAMUSCULAR; INTRAVENOUS at 16:44

## 2020-12-30 RX ADMIN — FENTANYL CITRATE 25 MCG: 50 INJECTION INTRAMUSCULAR; INTRAVENOUS at 16:46

## 2020-12-30 RX ADMIN — SODIUM CHLORIDE, POTASSIUM CHLORIDE, SODIUM LACTATE AND CALCIUM CHLORIDE: 600; 310; 30; 20 INJECTION, SOLUTION INTRAVENOUS at 14:35

## 2020-12-30 RX ADMIN — SODIUM CHLORIDE, POTASSIUM CHLORIDE, SODIUM LACTATE AND CALCIUM CHLORIDE: 600; 310; 30; 20 INJECTION, SOLUTION INTRAVENOUS at 16:29

## 2020-12-30 RX ADMIN — PROPOFOL 50 MG: 10 INJECTION, EMULSION INTRAVENOUS at 16:53

## 2020-12-30 RX ADMIN — LIDOCAINE HYDROCHLORIDE 0.1 ML: 10 INJECTION, SOLUTION EPIDURAL; INFILTRATION; INTRACAUDAL; PERINEURAL at 14:35

## 2020-12-30 RX ADMIN — PROPOFOL 100 MG: 10 INJECTION, EMULSION INTRAVENOUS at 16:44

## 2020-12-30 RX ADMIN — PROPOFOL 25 MG: 10 INJECTION, EMULSION INTRAVENOUS at 16:49

## 2020-12-30 RX ADMIN — PROPOFOL 25 MG: 10 INJECTION, EMULSION INTRAVENOUS at 16:59

## 2020-12-30 RX ADMIN — PROPOFOL 25 MG: 10 INJECTION, EMULSION INTRAVENOUS at 16:51

## 2020-12-30 RX ADMIN — FENTANYL CITRATE 25 MCG: 50 INJECTION INTRAMUSCULAR; INTRAVENOUS at 16:49

## 2020-12-30 RX ADMIN — PROPOFOL 50 MG: 10 INJECTION, EMULSION INTRAVENOUS at 16:40

## 2020-12-30 RX ADMIN — PROPOFOL 50 MG: 10 INJECTION, EMULSION INTRAVENOUS at 16:37

## 2020-12-30 RX ADMIN — FENTANYL CITRATE 25 MCG: 50 INJECTION INTRAMUSCULAR; INTRAVENOUS at 16:40

## 2020-12-30 RX ADMIN — PROPOFOL 25 MG: 10 INJECTION, EMULSION INTRAVENOUS at 17:00

## 2020-12-30 RX ADMIN — PROPOFOL 25 MG: 10 INJECTION, EMULSION INTRAVENOUS at 17:03

## 2020-12-30 RX ADMIN — DEXAMETHASONE SODIUM PHOSPHATE 10 MG: 4 INJECTION, SOLUTION INTRAMUSCULAR; INTRAVENOUS at 16:39

## 2020-12-30 RX ADMIN — Medication 1500 MG: at 16:35

## 2020-12-30 RX ADMIN — PROPOFOL 50 MG: 10 INJECTION, EMULSION INTRAVENOUS at 16:35

## 2020-12-30 RX ADMIN — PROPOFOL 50 MG: 10 INJECTION, EMULSION INTRAVENOUS at 16:41

## 2020-12-30 RX ADMIN — LIDOCAINE HYDROCHLORIDE 100 MG: 20 INJECTION, SOLUTION INFILTRATION; PERINEURAL at 16:34

## 2020-12-30 ASSESSMENT — PULMONARY FUNCTION TESTS
PIF_VALUE: 0
PIF_VALUE: 2
PIF_VALUE: 0
PIF_VALUE: 1
PIF_VALUE: 0
PIF_VALUE: 0
PIF_VALUE: 2
PIF_VALUE: 0

## 2020-12-30 NOTE — PROGRESS NOTES
Kamron Del Rosario reviewed EKG that was sent over with pt H&P. Ok to proceed. Pre op labs normal limits.

## 2020-12-30 NOTE — ANESTHESIA PRE PROCEDURE
Department of Anesthesiology  Preprocedure Note       Name:  Cameron Solis   Age:  77 y.o.  :  1955                                          MRN:  [id number]         Date:  2020      Surgeon: Han Mohan):  Searcy Galeazzi, MD    Procedure: Procedure(s):  RIGHT DISTAL RADIUS OPEN REDUCTION INTERNAL FIXATION    Medications prior to admission:   Prior to Admission medications    Medication Sig Start Date End Date Taking? Authorizing Provider   oxyCODONE-acetaminophen (PERCOCET) 5-325 MG per tablet Take 1 tablet by mouth every 8 hours as needed for Pain for up to 7 days. 20  Bob Day MD   metoprolol (LOPRESSOR) 50 MG tablet Take 50 mg by mouth 2 times daily.     Historical Provider, MD       Current medications:    Current Facility-Administered Medications   Medication Dose Route Frequency Provider Last Rate Last Admin    lactated ringers infusion   Intravenous Continuous Julieta Willett MD        sodium chloride flush 0.9 % injection 10 mL  10 mL Intravenous 2 times per day Julieta Willett MD        sodium chloride flush 0.9 % injection 10 mL  10 mL Intravenous PRN Julieta Willett MD        famotidine (PEPCID) injection 20 mg  20 mg Intravenous Once Julieta Willett MD        vancomycin 1.5 g in dextrose 5% 300 mL IVPB  1,500 mg Intravenous Once Searcy Galeazzi, MD        lidocaine 1 % (PF) injection 0.1 mL  0.1 mL Intradermal Once Reanna Cárdenas MD        meperidine (DEMEROL) injection SOLN 12.5 mg  12.5 mg Intravenous Q5 Min PRN Reanna Cárdenas MD        HYDROmorphone (DILAUDID) injection 0.5 mg  0.5 mg Intravenous Q10 Min PRN Reanna Cárdenas MD        HYDROmorphone (DILAUDID) injection 0.5 mg  0.5 mg Intravenous Q5 Min PRN Reanna Cárdenas MD        oxyCODONE-acetaminophen (PERCOCET) 5-325 MG per tablet 1 tablet  1 tablet Oral PRN Reanna Cárdenas MD        Or  oxyCODONE-acetaminophen (PERCOCET) 5-325 MG per tablet 2 tablet  2 tablet Oral PRN Amparo Hernadez MD        ondansetron [facility]) injection 4 mg  4 mg Intravenous Q30 Min PRN Amparo Hernadez MD        diphenhydrAMINE (BENADRYL) injection 6.25 mg  6.25 mg Intravenous Once PRN Amparo Hernadez MD        labetalol (NORMODYNE;TRANDATE) injection 5 mg  5 mg Intravenous Q15 Min PRN Amparo Hernadez MD        hydrALAZINE (APRESOLINE) injection 5 mg  5 mg Intravenous Q30 Min PRN Amparo Hernadez MD         Facility-Administered Medications Ordered in Other Encounters   Medication Dose Route Frequency Provider Last Rate Last Admin    bupivacaine (PF) (MARCAINE) 0.5 % injection                Allergies:  No Known Allergies    Problem List:    Patient Active Problem List   Diagnosis Code    Closed fracture of right distal radius S52.501A       Past Medical History:        Diagnosis Date    Hypertension        Past Surgical History:  History reviewed. No pertinent surgical history. Social History:    Social History     Tobacco Use    Smoking status: Former Smoker     Quit date: 2014     Years since quittin.0    Smokeless tobacco: Never Used   Substance Use Topics    Alcohol use: No                                Counseling given: Not Answered      Vital Signs (Current): There were no vitals filed for this visit.                                            BP Readings from Last 3 Encounters:   20 (!) 173/92   18 (!) 165/80       NPO Status:                                                                                 BMI:   Wt Readings from Last 3 Encounters:   20 185 lb (83.9 kg)   20 185 lb (83.9 kg)   18 170 lb (77.1 kg)     There is no height or weight on file to calculate BMI.    CBC:   Lab Results   Component Value Date    WBC 9.6 2020    RBC 4.60 2020    HGB 13.5 2020    HCT 40.5 2020    MCV 88.1 2020 RDW 13.1 12/29/2020     12/29/2020       CMP:   Lab Results   Component Value Date     12/29/2020    K 4.4 12/29/2020    K 4.3 11/30/2018     12/29/2020    CO2 31 12/29/2020    BUN 18 12/29/2020    CREATININE 0.7 12/29/2020    GFRAA >60 12/29/2020    AGRATIO 1.3 11/30/2018    LABGLOM >60 12/29/2020    GLUCOSE 119 12/29/2020    PROT 7.5 11/30/2018    CALCIUM 9.7 12/29/2020    BILITOT 0.4 11/30/2018    ALKPHOS 85 11/30/2018    AST 21 11/30/2018    ALT 16 11/30/2018       POC Tests: No results for input(s): POCGLU, POCNA, POCK, POCCL, POCBUN, POCHEMO, POCHCT in the last 72 hours. Coags: No results found for: PROTIME, INR, APTT    HCG (If Applicable): No results found for: PREGTESTUR, PREGSERUM, HCG, HCGQUANT     ABGs: No results found for: PHART, PO2ART, KGJ9NXS, POU9RXY, BEART, V3CLRESO     Type & Screen (If Applicable):  No results found for: LABABO, LABRH    Drug/Infectious Status (If Applicable):  No results found for: HIV, HEPCAB    COVID-19 Screening (If Applicable):   Lab Results   Component Value Date    COVID19 Not Detected 12/29/2020         Anesthesia Evaluation  Patient summary reviewed and Nursing notes reviewed no history of anesthetic complications:   Airway: Mallampati: II     Neck ROM: full   Dental:          Pulmonary:                              Cardiovascular:    (+) hypertension:,                   Neuro/Psych:               GI/Hepatic/Renal:            ROS comment: obesity. Endo/Other:                     Abdominal:           Vascular:                                        Anesthesia Plan      general and regional     ASA 2     (Medications & allergies reviewed  All available lab & EKG data reviewed  SCB for POA)  Induction: intravenous. Anesthetic plan and risks discussed with patient. Plan discussed with CRNA.                   Zenon Bonilla MD   12/30/2020

## 2020-12-30 NOTE — ANESTHESIA POSTPROCEDURE EVALUATION
WBC                      9.6                 12/29/2020 01:16 PM        HGB                      13.5                12/29/2020 01:16 PM        HCT                      40.5                12/29/2020 01:16 PM        PLT                      327                 12/29/2020 01:16 PM   RENAL  Lab Results       Component                Value               Date/Time                  NA                       141                 12/29/2020 01:16 PM        K                        4.4                 12/29/2020 01:16 PM        K                        4.3                 11/30/2018 09:10 PM        CL                       102                 12/29/2020 01:16 PM        CO2                      31                  12/29/2020 01:16 PM        BUN                      18                  12/29/2020 01:16 PM        CREATININE               0.7                 12/29/2020 01:16 PM        GLUCOSE                  119 (H)             12/29/2020 01:16 PM   COAGS  No results found for: PROTIME, INR, APTT    Intake & Output: In: 500 (I.V.:500)  Out: -     Nausea & Vomiting:  No    Level of Consciousness:  Awake    Pain Assessment:  Adequate analgesia    Anesthesia Complications:  No apparent anesthetic complications    SUMMARY      Vital signs stable  OK to discharge from Stage I post anesthesia care.   Care transferred from Anesthesiology department on discharge from perioperative area

## 2020-12-30 NOTE — OP NOTE
Irina Burrell (1955)    Date of Surgery- 12/30/2020    Preoperative Diagnosis-   1.  right Distal Radius Fracture                                           Postoperative Diagnosis-  1.  right Distal Radius Fracture    Procedure-     1. Open reduction and internal fixation right distal radius fracture, intra-articular                2.  Flouroscopy right wrist      Surgeon-  Addison mSith MD    [id number] [facility]      Anesthesia- Regional block / MAC    Implants- Biomet    Antibiotics- See Anesthesia Note    EBL-  5cc    DVT prophylaxis- SCDs    Complications- none    The patient was brought to the operating and room and placed in the supine position. After the induction of anesthesia a standard time-out was performed.       Description of the Procedure: The right upper extremity had been prepped and draped in normal sterile fashion. Following the final timeout and ensuring antibiotic and DVT prophylaxis, the right upper extremity was exsanguinated and the tourniquet was inflated to 250 mmHg. A standard volar radial incision was made over the FCR, through skin and subcutaneous tissue, protecting the nearby artery and sensory nerve. The FCR sheath was opened and the tendon was temporarily moved. Dissection was taken carefully through the floor of the FCR. Blunt finger dissection was taken to the deeper structures and a blunt retractor was placed. Pronator quadratus was elevated off its radial border exposing the fracture. Fracture was intra-articular and displaced, more than 3 fragments. Early fracture hematoma and healing tissue was gently debrided. The fracture was then reduced back into a more anatomic alignment and confirmed visually and with fluoroscopy. Contextbroker system was used, narrow right plate was selected and placed in the center center position. Volar rim plate due to the distal extent of the fracture. 4 screws were placed along the shaft utilizing standard AO technique, taking care to measure appropriate length screws. All screws distally were placed utilizing the standard drill guides for the system or the variable angle technology. All drill guides were removed and accounted for. Care was taken to measure the appropriate length screws distally also. At this point there was excellent alignment and stability of the fracture. Intraoperative fluoroscopy confirmed alignment of the fracture and hardware, final fluoroscopic images in the AP, lateral, joint line were all obtained and printed. No DRUJ instability. Motion of the wrist intraoperatively was fluid and non-crepitant. Wound was copiously irrigated. Pronator quadratus was laid over the hardware and the skin was then closed with interrupted nylon suture.   Tourniquet was deflated and all fingers were warm and well-perfused. Good hemostatic control before wound closure. Incision was covered with Xeroform and a volar splint. Patient tolerated the case well, was awoken from sedation and taken to the postanesthesia recovery area in good condition. No complications during the case. Addendum: It should be noted that 3 views of the operative Right wrist were performed with mini C-Arm fluoroscopy for the distal radius fracture. AP, lateral, and oblique views demonstrated satisfactory alignment of the fracture and hardware. Final films were saved and printed. Findings:         Intervention:         Other Notes: Follow-up in 7-10 days, wound inspection and likely suture removal.  Hand therapy referral for a short arm clamshell brace. Begin early motion of the wrist and include motion of the fingers and elbow. No strengthening or lifting for 6-8 weeks.             Cynthia Vanegas MD  Hand & Upper Extremity Surgery  [facility]

## 2021-01-04 ENCOUNTER — HOSPITAL ENCOUNTER (OUTPATIENT)
Dept: OCCUPATIONAL THERAPY | Age: 66
Setting detail: THERAPIES SERIES
Discharge: HOME | End: 2021-01-04
Payer: COMMERCIAL

## 2021-01-04 PROCEDURE — 97165 OT EVAL LOW COMPLEX 30 MIN: CPT | Performed by: OCCUPATIONAL THERAPIST

## 2021-01-04 PROCEDURE — L3906 WHO W/O JOINTS CF: HCPCS | Performed by: OCCUPATIONAL THERAPIST

## 2021-01-04 PROCEDURE — 97110 THERAPEUTIC EXERCISES: CPT | Performed by: OCCUPATIONAL THERAPIST

## 2021-01-04 NOTE — PLAN OF CARE
[address] [place], [address] [place] [address] [address]  Phone: [phone] Fax: [phone]        Occupational [facility]  Dear Referring Practitioner: Dr. Whitney Martínez,     We had the pleasure of evaluating the following patient for occupational therapy services at [address] [place] [address]. A summary of our findings can be found in the initial assessment below. This includes our plan of care. If you have any questions or concerns regarding these findings, please do not hesitate to contact me at the office phone number checked above. Thank you for the referral.     Physician Signature:_______________________________Date:__________________  By signing above (or electronic signature), therapists plan is approved by physician      Patient: Sarah Desai   : 1955   MRN: [id number]  Referring Physician: Referring Practitioner: Dr. Whitney Martínez      Evaluation Date: 2021      Medical Diagnosis Information:  Diagnosis: T48.632B (ICD-10-CM) - Other closed fracture of distal end of right radius    Treatment Diagnosis: M25.531 pain right wrist              Insurance information: OT Insurance Information: Work Comp      Date of Injury: 20  Date of Surgery: 20  Open reduction and internal fixation right distal radius fracture, intra-articular      Date of Patient follow up with Physician: 20    RESTRICTIONS/PRECAUTIONS: Hand therapy referral for a short arm clamshell brace. Begin early motion of the wrist and include motion of the fingers and elbow.   No strengthening or lifting for 6-8 weeks    Latex Allergy:  [x]No      []Yes  Pacemaker:  [x] No       [] Yes     Preferred Language for Healthcare:   [x]English       []other:     Functional Scale: 73% (Quick DASH)   Date assessed:  2021    C-SSRS Triggered by Intake questionnaire (Past 2 wk assessment):    No, Questionnaire did not trigger screening. SUBJECTIVE: Patient reported deficits/history of current problem: Slipped on the floor and fell on her right arm at work. Pain Scale: 5/10  [x]Constant      []Intermittent    []other:  Pain Location:  Right wrist  Easing factors: rest  Provocative factors: moving arm around     [x] Patient reported history, allergies, and medications reviewed - see intake form. 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[place] [address] Activities/Meaningful Interests: nothing specific    Prior Level of Function: [x] Independent with ADLs/IADLs     [] Assistance needed (describe):    Patient-Identified Primary Performance Deficits (to be addressed in POC):   [x] bathing    [x] household tasks    [x] dressing    [] self feeding   [x] grooming - can't fix hair    [x] work/education   [] functional mobility   [] sleeping/rest   [] toileting/hygiene   [] recreational activities   [] driving    [] community/social participation   [] other:     Comorbidities Affecting Functional Performance:     []Anxiety (F41.9)/Depression (F32.9)   []Diabetes Type 1(E10.65) or 2 (E11.65)   []Rheumatoid Arthritis (M05.9)  []Fibromyalgia (M79.7)  []Neuropathy(G60.9)  []Osteoarthritis(M19.91)  [x]None   []Other:    Hand Dominance:    []  Right    [x] Left      OBJECTIVE:    1/4/21   AROM: Right   Digits: tips to DPFC   WNL, slight stiffness infull composite fist     Thumb MP  IP 0/45  0/35     Thumb tip to DPFC To tip of small   Wrist Ext/Flex            RD/UD 30/30  15/30   Forearm sup/pron   50/45   Edema:  Moderate by visual inspection around wrist   Strength: defer    II    Lateral Pinch    3 Point Pinch    Tip Pinch    MMT:           Observations (including splints, bandages, incisions, scars):   Sutures dry and intact    Sensation:  [x] No reported deficits  [] Intact to light touch    [] Raymond Сергей test completed, findings as noted:  [] Other:    Palpation: N/A    Functional Mobility/Transfers/Gait:  [x] Independent - no significant gait deviations  [] Assistance needed   [] Assistive device used: Falls Risk Assessment (30 days):   [x] Falls Risk assessed and no intervention required. [] Falls Risk assessed and Patient requires intervention due to being higher risk   TUG score (>12s at risk):     [] Falls education provided, including      Review Of Systems (ROS): [x]Performed Review of systems (Integumentary, CardioPulmonary, Neurological) by intake and observation. Intake form has been scanned into medical record. Patient has been instructed to contact their primary care physician regarding ROS issues if not already being addressed at this time. ASSESSMENT:   This patient presents with signs and symptoms consistent with the medical diagnosis provided by the referring physician. Impairments (physical, cognitive and/or psychosocial):  [x] Decreased mobility  [x] Weakness    [] Hypersensitivity   [x] Pain/tenderness   [x] Edema/swelling   [x] Decreased coordination (fine/gross motor)   [] Impaired body mechanics  [] Sensory loss  [] Loss of balance   [] Other:      Performance Deficits (to be addressed in plan of care):   [x] Bathing    [x] Household Tasks    [x] Dressing    [] Self Feeding   [x] Grooming    [] Work/Education   [] Functional Mobility  [] Sleeping/Rest   [] Toileting/Hygiene   [x] Recreational Activities   [] Driving    [] Community/Social Participation   [] Other:     Rehab Potential:   [] Excellent [x] Good [] Fair  [] Poor     Barriers affecting rehab potential:  []Age    []Lack of Motivation   []Co-Morbidities  []Cognitive Function  []Environmental/home/work barriers  []Other:     Tolerance of evaluation/treatment:    [] Excellent [x] Good [] Fair  [] Poor    PLAN OF CARE:  Interventions:   [x] Therapeutic Exercise [x] Therapeutic Activity    [x] Activities of Daily Living [x] Neuromuscular Re-education      [x] Patient Education  [x] Manual Therapy      [x] Modalities as needed, and not otherwise contraindicated, including: ultrasound,paraffin,moist heat/cold pack, electrical stimulation, contrast bath, iontophoresis  [] Splinting    Frequency/Duration:  1-2 days per week for 10 weeks      GOALS:  Patient stated goal: full use of right hand    [] Progressing: [] Met: [] Not Met: [] Adjusted    Therapist goals for Patient:   Short Term Goals: To be achieved in: 2 weeks  1. Independent in HEP and progression per patient tolerance, in order to prevent re-injury. [] Progressing: [] Met: [] Not Met: [] Adjusted   2. Patient will have a decrease in pain to facilitate improvement in movement, function, and ADLs as indicated by Functional Deficits. [] Progressing: [] Met: [] Not Met: [] Adjusted    Long Term Goals to be achieved in 8 weeks (through 3/1/21), including patient directed goals to address patient identified performance deficits:  1) Pt to be independent in graded HEP progression with a good level of effort and compliance. [] Progressing: [] Met: [] Not Met: [] Adjusted   2) Pt to report a score of </= 20 % on the Quick DASH disability questionnaire for increased performance with carrying, moving, and handling objects. [] Progressing: [] Met: [] Not Met: [] Adjusted   3) Pt will demonstrate increased right wrist 60/60, hand and wrist WNL, forearm 75/75 ROM  for improved independence with completing welf care without difficulty including washing and styling hair. [] Progressing: [] Met: [] Not Met: [] Adjusted   4) Pt will demonstrate increased strength to right  60% of left% of uninvolved hand for improved independence with doing normal household tasks and RTW.   [] Progressing: [] Met: [] Not Met: [] Adjusted   5) Pt will have a decrease in pain to 2/10 to facilitate return to normal functional use patterns during day. [] Progressing: [] Met: [] Not Met: [] Adjusted        OCCUPATIONAL THERAPY EVALUATION COMPLEXITY JUSTIFICATION:    [x] An occupational profile and medical/therapy history, which includes:   [x] a brief history including medical and/or therapy records relating to the     presenting problem   [] an expanded review of medical and/or therapy records and additional review     of physical, cognitive or psychosocial history related to current functional    performance   [] an extensive additional review of review of medical and/or therapy records and physical, cognitive, or psychosocial history related to current    functional performance    [x] An assessment that identifies performance deficits (relating to physical, cognitive, or psychosocial skills) that result in activity limitations and/or participation restrictions:   [x] 1-3 performance deficits   [] 3-5 performance deficits   [] 5 or more performance deficits    [x] Clinical decision making of:   [x] low complexity, including analysis of occupational profile, data analysis from problem focused assessment, and consideration of a limited number of treatment options. No comorbidities affect occupational performance. No task modifications or assistance needed to complete evaluation. [] moderate complexity, including analysis of occupational profile, data analysis from detailed assessment and consideration of several treatment options. Comorbidities that affect occupational performance may be present. Minimal to moderate task modifications or assistance needed to complete assessment. [] high complexity, including analysis of occupational profile, analysis of data from comprehensive assessment and consideration of multiple treatment options. Multiple comorbidities present that affect occupational performance. Significant task modifications or assistance needed to complete assessment.     Evaluation Code:  [x] Low Complexity EVAL 52466 (typically 30 minutes face to face)  [] Mod Complexity EVAL 34043 (typically 45 minutes face to face)  [] High Complexity EVAL 62050 (typically 60 minutes face to face)    Electronically signed by:  Ngoc Clark, [place] - [id number]

## 2021-01-04 NOTE — FLOWSHEET NOTE
2 [address] [place], [address] [place] [address] [address]  Phone: [phone] Fax: [phone]    Occupational Therapy Treatment Note/ Progress Report:     Date:  2021    Patient Name:  Elsy Andrews    :  1955  MRN: [id number]    Medical/Treatment Diagnosis Information:  · Diagnosis: S52.591A (ICD-10-CM) - Other closed fracture of distal end of right radius   · Treatment Diagnosis: M25.531 pain right wrist     Insurance/Certification information:  OT Insurance Information: Work Comp  Physician Information:  Referring Practitioner: Dr. Matthew Gaspar  Has the plan of care been signed (Y/N):        []  Yes  [x]  No     Visit # Insurance Allowable Auth Required   1    []  Yes []  No      Is this a Progress Report:     []  Yes  [x]  No      If Yes:  Date Range for reporting period:  Beginning  Ending    Progress report will be due (10 Rx or 30 days whichever is less): 1/3/95     Recertification will be due (POC Duration  / 90 days whichever is less): 3/1/21    Date of Injury: 20  Date of Surgery: 20  Open reduction and internal fixation right distal radius fracture, intra-articular       Date of Patient follow up with Physician: 20     RESTRICTIONS/PRECAUTIONS: Hand therapy referral for a short arm clamshell brace.  Begin early motion of the wrist and include motion of the fingers and elbow.  No strengthening or lifting for 6-8 weeks     Latex Allergy:  []? No      []? Yes                    Pacemaker:  []? No       []? Yes      Preferred Language for Healthcare:   [x]? English       []? other:      Functional Scale: 73% (Quick DASH)                                 Date assessed:  2021     C-SSRS Triggered by Intake questionnaire (Past 2 wk assessment):    No, Questionnaire did not trigger screening.      SUBJECTIVE: Patient reported deficits/history of current problem: Slipped on the floor and fell on her right Provided verbal/tactile cueing for activities related to improving balance, coordination, kinesthetic sense, posture, motor skill, proprioception and motor activation to allow for proper function of scapular, scapulothoracic and UE control with self care, carrying, lifting, driving/computer work    Home Exercise Program:    [] (35135) Reviewed/Progressed HEP activities related to strengthening, flexibility, endurance, ROM of scapular, scapulothoracic and UE control with self care, reaching, carrying, lifting, house/yardwork, driving/computer work  [] (17847) Reviewed/Progressed HEP activities related to improving balance, coordination, kinesthetic sense, posture, motor skill, proprioception of scapular, scapulothoracic and UE control with self care, reaching, carrying, lifting, house/yardwork, driving/computer work      Manual Treatments:  PROM / STM / Oscillations-Mobs:  G-I, II, III, IV (PA's, Inf., Post.)  [] (96291) Provided manual therapy to mobilize soft tissue/joints of cervical/CT, scapular GHJ and UE for the purpose of modulating pain, promoting relaxation,  increasing ROM, reducing/eliminating soft tissue swelling/inflammation/restriction, improving soft tissue extensibility and allowing for proper ROM for normal function with self care, reaching, carrying, lifting, house/yardwork, driving/computer work    ADL Training:  [] (74955) Provided self-care/home management training related to activities of daily living and compensatory training, and/or use of adaptive equipment      Charges:  Timed Code Treatment Minutes: 10   Total Treatment Minutes: 45   Worker's Comp: Time In/Time Out 10:30 - 11:15    [x] EVAL (LOW) 25836 (typically 20 minutes face-to-face) 10:30 - 10:50   [] EVAL (MOD) 53458 (typically 30 minutes face-to-face)  [] EVAL (HIGH) 19183 (typically 45 minutes face-to-face)  [] OT Re-eval (32136)       [x] Fallon (28517) x  1 10:50 - 11:00   [] FHOHH(58594)  [] NMR (18204) x      [] Estim (attended) (62661)   [] Manual (92411) x      [] US (04831)  [] TA (50061) x      [] Paraffin (80907)  [] ADL  (19634) x     [x] Splint/L code:    [] Estim (unattended) (26103)  [] Fluidotherapy (20016)  [] Other:      ASSESSMENT:  See eval    GOALS: Patient stated goal: full use of right hand    []? Progressing: []? Met: []? Not Met: []? Adjusted     Therapist goals for Patient:   Short Term Goals: To be achieved in: 2 weeks  1. Independent in HEP and progression per patient tolerance, in order to prevent re-injury. []? Progressing: []? Met: []? Not Met: []? Adjusted   2. Patient will have a decrease in pain to facilitate improvement in movement, function, and ADLs as indicated by Functional Deficits. []? Progressing: []? Met: []? Not Met: []? Adjusted     Long Term Goals to be achieved in 8 weeks (through 3/1/21), including patient directed goals to address patient identified performance deficits:  1) Pt to be independent in graded HEP progression with a good level of effort and compliance. []? Progressing: []? Met: []? Not Met: []? Adjusted   2) Pt to report a score of </= 20 % on the Quick DASH disability questionnaire for increased performance with carrying, moving, and handling objects. []? Progressing: []? Met: []? Not Met: []? Adjusted   3) Pt will demonstrate increased right wrist 60/60, hand and wrist WNL, forearm 75/75 ROM  for improved independence with completing welf care without difficulty including washing and styling hair. []? Progressing: []? Met: []? Not Met: []? Adjusted   4) Pt will demonstrate increased strength to right  60% of left% of uninvolved hand for improved independence with doing normal household tasks and RTW. []? Progressing: []? Met: []? Not Met: []? Adjusted   5) Pt will have a decrease in pain to 2/10 to facilitate return to normal functional use patterns during day. []? Progressing: []? Met: []? Not Met: []?  Adjusted      Overall Progression Towards Functional Goals/Treatment Progress Update:  [] Patient is progressing as expected towards functional goals listed. [] Progression is slowed due to complexities/impairments listed. [] Progression has been slowed due to co-morbidities. [x] Plan just implemented, too soon to assess goals progression <30 days  [] Goals require adjustment due to lack of progress  [] Patient is not progressing as expected and requires additional follow up with physician  [] All goals are met  [] Other:     Prognosis for POC: [x] Good [] Fair  [] Poor    Patient requires continued skilled intervention: [x] Yes  [] No    Treatment/Activity Tolerance:  [x] Patient able to complete treatment  [] Patient limited by fatigue  [] Patient limited by pain    [] Patient limited by other medical complications  [] Other:                  PLAN: See eval  [] Continue per plan of care [] Alter current plan (see comments above)  [x] Plan of care initiated [] Hold pending MD visit [] Discharge      Electronically signed by:  Roxane Esparza , ALANNAR\ROXANE, CHT - [id number]    Note: If patient does not return for scheduled/ recommended follow up visits, this note will serve as a discharge from care along with most recent update on progress.

## 2021-01-06 ENCOUNTER — TELEPHONE (OUTPATIENT)
Dept: ORTHOPEDIC SURGERY | Age: 66
End: 2021-01-06

## 2021-01-06 DIAGNOSIS — S52.591A OTHER CLOSED FRACTURE OF DISTAL END OF RIGHT RADIUS, INITIAL ENCOUNTER: Primary | ICD-10-CM

## 2021-01-06 RX ORDER — OXYCODONE HYDROCHLORIDE AND ACETAMINOPHEN 5; 325 MG/1; MG/1
1 TABLET ORAL EVERY 8 HOURS PRN
Qty: 21 TABLET | Refills: 0 | Status: SHIPPED | OUTPATIENT
Start: 2021-01-06 | End: 2021-01-13

## 2021-01-07 ENCOUNTER — APPOINTMENT (OUTPATIENT)
Dept: CT IMAGING | Age: 66
End: 2021-01-07
Payer: COMMERCIAL

## 2021-01-07 ENCOUNTER — HOSPITAL ENCOUNTER (EMERGENCY)
Age: 66
Discharge: HOME | End: 2021-01-07
Payer: COMMERCIAL

## 2021-01-07 VITALS
SYSTOLIC BLOOD PRESSURE: 171 MMHG | BODY MASS INDEX: 33.13 KG/M2 | WEIGHT: 180 LBS | RESPIRATION RATE: 18 BRPM | HEIGHT: 62 IN | DIASTOLIC BLOOD PRESSURE: 86 MMHG | OXYGEN SATURATION: 96 % | TEMPERATURE: 98.1 F | HEART RATE: 88 BPM

## 2021-01-07 DIAGNOSIS — M54.41 ACUTE RIGHT-SIDED LOW BACK PAIN WITH RIGHT-SIDED SCIATICA: Primary | ICD-10-CM

## 2021-01-07 PROCEDURE — 99283 EMERGENCY DEPT VISIT LOW MDM: CPT

## 2021-01-07 PROCEDURE — 72131 CT LUMBAR SPINE W/O DYE: CPT

## 2021-01-07 RX ORDER — PREDNISONE 20 MG/1
TABLET ORAL
Qty: 15 TABLET | Refills: 0 | Status: SHIPPED | OUTPATIENT
Start: 2021-01-07 | End: 2021-01-17

## 2021-01-07 RX ORDER — HYDROCODONE BITARTRATE AND ACETAMINOPHEN 5; 325 MG/1; MG/1
1 TABLET ORAL EVERY 6 HOURS PRN
Qty: 10 TABLET | Refills: 0 | Status: SHIPPED | OUTPATIENT
Start: 2021-01-07 | End: 2021-01-10

## 2021-01-07 RX ORDER — CYCLOBENZAPRINE HCL 10 MG
10 TABLET ORAL 3 TIMES DAILY PRN
Qty: 21 TABLET | Refills: 0 | Status: SHIPPED | OUTPATIENT
Start: 2021-01-07 | End: 2021-01-17

## 2021-01-07 NOTE — ED NOTES
Pt stated she didnt want to fill norco script since she had percocet's script for her wrist. Pt ripped script and this rn disposed of script in shredder.      Haley Mora, RN  01/07/21 0242

## 2021-01-09 NOTE — ED PROVIDER NOTES
[address] [place] [address]  ED  EMERGENCY DEPARTMENT ENCOUNTER      This patient was not seen and evaluated by the attending physician. Pt Name: Robinson Courtney  MRN: [id number]  Jeremygfkatelynn 1955  Date of evaluation: 1/7/2021  Provider: ROSALIE Zaragoza - CNP-C  PCP: No primary care provider on file. History provided by the patient. CHIEFCOMPLAINT:     Chief Complaint   Patient presents with    Back Pain     right sided lower back pain since a fall at work at the end of december. denies any urinary symptoms       HISTORY OF PRESENT ILLNESS:      Robinson Courtney is a 77 y.o. female who presents to [address] [place] [address]  ED with complaints of low back pain that is right-sided. Patient states that she has had back pain since the end of December when she fell. When she fell she did also hit her wrist and injured her wrist, is currently in a cast, she has had surgery for this. She complains of right-sided low back pain that does have radicular symptoms into her right leg. No loss control of bowel or bladder, no numbness or tingling or saddle anesthesia identified. LOCATION:low back  QUALITY:ache  SEVERITY:8  DURATION:since end of december  MODIFYING FACTORS:no    Nursing Notes were reviewed     REVIEW OF SYSTEMS:     Review of Systems  All systems, a total of 10, are reviewed and negative except for those that were just noted in history present illness.         PAST MEDICAL HISTORY:     Past Medical History:   Diagnosis Date    Hypertension          SURGICAL HISTORY:      Past Surgical History:   Procedure Laterality Date    FOREARM SURGERY Right 12/30/2020    RIGHT DISTAL RADIUS OPEN REDUCTION INTERNAL FIXATION performed by Alee Velazquez MD at [address]:       Discharge Medication List as of 1/7/2021  3:30 PM      CONTINUE these medications which have NOT CHANGED    Details   oxyCODONE-acetaminophen (PERCOCET) 5-325 MG per tablet Take 1 tablet by mouth every 8 hours as needed for Pain for up to 7 days. , Disp-21 tablet, R-0Normal      metoprolol (LOPRESSOR) 50 MG tablet Take 50 mg by mouth 2 times daily. ALLERGIES:    Patient has no known allergies. FAMILY HISTORY:     History reviewed. No pertinent family history. SOCIAL HISTORY:     Social History     Socioeconomic History    Marital status:      Spouse name: None    Number of children: None    Years of education: None    Highest education level: None   Occupational History    None   Social Needs    Financial resource strain: None    Food insecurity     Worry: None     Inability: None    Transportation needs     Medical: None     Non-medical: None   Tobacco Use    Smoking status: Former Smoker     Quit date: 2014     Years since quittin.0    Smokeless tobacco: Never Used   Substance and Sexual Activity    Alcohol use: Yes     Comment: occassionally    Drug use: No    Sexual activity: None   Lifestyle    Physical activity     Days per week: None     Minutes per session: None    Stress: None   Relationships    Social connections     Talks on phone: None     Gets together: None     Attends [employer] service: None     Active member of club or organization: None     Attends meetings of clubs or organizations: None     Relationship status: None    Intimate partner violence     Fear of current or ex partner: None     Emotionally abused: None     Physically abused: None     Forced sexual activity: None   Other Topics Concern    None   Social History Narrative    None       SCREENINGS:             PHYSICAL EXAM:       ED Triage Vitals [21 1305]   BP Temp Temp Source Pulse Resp SpO2 Height Weight   (!) 171/86 98.1 °F (36.7 °C) Oral 88 18 96 % 5' 2\" (1.575 m) 180 lb (81.6 kg)       Physical Exam    CONSTITUTIONAL: Awake and alert. Cooperative. Well-developed. Well-nourished.   Vitals:    21 1305   BP: (!) 171/86   Pulse: 88   Resp: 18   Temp: 98.1 °F (36.7 °C)   TempSrc: Oral   SpO2: 96%   Weight: 180 lb (81.6 kg)   Height: 5' 2\" (1.575 m)     HENT: Normocephalic. Atraumatic. External ears normal, without discharge. TMs clear bilaterally. Nonasal discharge. Oropharynx clear, no erythema. Mucous membranes moist.  EYES: Conjunctiva non-injected, nolid abnormalities noted. No scleral icterus. PERRL. EOM's grossly intact. Anterior chambers clear. NECK: Supple. Normal ROM. No meningismus. No thyroid tenderness or swelling noted. CARDIOVASCULAR: RRR. No Murmer. No carotid bruits. PULMONARY/CHEST WALL: Effort normal. No tachypnea. Lungs clear to ausculation. ABDOMEN: Normal BS. Soft. Nondistended. No tenderness to palpation. No guarding. No hernias noted. No splenomegaly. Back: Spine is midline. No ecchymosis. No crepituson palpation. No obvious subluxation of vertebral column. No saddle anesthesia or evidence of cauda equina. Mild right lumbar paravertebral musculature tenderness, minimal midline tenderness noted. /ANORECTAL: Not assessed  MUSKULOSKELETAL: Normal ROM. No acute deformities. No edema. No tenderness to palpate. SKIN: Warm and dry. NEUROLOGICAL:  GCS 15. CN II-XII grossly intact. Strength is 5/5 in all extremities and sensation is intact. PSYCHIATRIC: Normal affect, normal insight and judgement. Alert and oriented x 3. DIAGNOSTIC RESULTS:     LABS:    No results found for this visit on 01/07/21. RADIOLOGY:  All x-ray studies are viewed/reviewed by me. Formal interpretations per the radiologist are as follows:      CT LUMBAR SPINE WO CONTRAST   Final Result   1. No traumatic abnormality. 2. Multilevel degenerative changes as above. Further evaluation with MRI   could be helpful for better characterization if clinically indicated. EKG:  See EKG interpretation by an attending physician.       PROCEDURES:   N/A    CRITICAL CARE TIME:   N/A    CONSULTS:  None      EMERGENCY DEPARTMENT COURSE [address] [facility] [address]  [phone]    Call   For follow up    [facility]  ED  [address]  [facility] [place] [address] [phone]  Go to   If symptoms worsen      DISCHARGE MEDICATIONS:  Discharge Medication List as of 1/7/2021  3:30 PM      START taking these medications    Details   predniSONE (DELTASONE) 20 MG tablet Take 3 tablets by mouth once daily for 5 days, Disp-15 tablet, R-0Print      cyclobenzaprine (FLEXERIL) 10 MG tablet Take 1 tablet by mouth 3 times daily as needed for Muscle spasms, Disp-21 tablet, R-0Print                        (Please note thatportions of this note were completed with a voice recognition program.  Efforts were made to edit the dictations, but occasionally words are mis-transcribed.)    ROSALIE Florentino - CNP-C (electronicallysigned)        ROSALIE Florentino CNP  01/09/21 8151

## 2021-01-12 ENCOUNTER — TELEPHONE (OUTPATIENT)
Dept: ORTHOPEDIC SURGERY | Age: 66
End: 2021-01-12

## 2021-01-12 NOTE — TELEPHONE ENCOUNTER
FAXED [facility] ( [facility] ) LAST 10 YEARS TO PRESENT TO ONE SOURCE @ [phone]

## 2021-01-13 ENCOUNTER — OFFICE VISIT (OUTPATIENT)
Dept: ORTHOPEDIC SURGERY | Age: 66
End: 2021-01-13

## 2021-01-13 ENCOUNTER — HOSPITAL ENCOUNTER (OUTPATIENT)
Dept: OCCUPATIONAL THERAPY | Age: 66
Setting detail: THERAPIES SERIES
Discharge: HOME | End: 2021-01-13
Payer: COMMERCIAL

## 2021-01-13 VITALS — BODY MASS INDEX: 33.13 KG/M2 | HEIGHT: 62 IN | WEIGHT: 180 LBS

## 2021-01-13 DIAGNOSIS — S52.591A OTHER CLOSED FRACTURE OF DISTAL END OF RIGHT RADIUS, INITIAL ENCOUNTER: Primary | ICD-10-CM

## 2021-01-13 PROCEDURE — 99024 POSTOP FOLLOW-UP VISIT: CPT | Performed by: ORTHOPAEDIC SURGERY

## 2021-01-13 PROCEDURE — 97110 THERAPEUTIC EXERCISES: CPT | Performed by: OCCUPATIONAL THERAPIST

## 2021-01-13 PROCEDURE — 97530 THERAPEUTIC ACTIVITIES: CPT | Performed by: OCCUPATIONAL THERAPIST

## 2021-01-13 PROCEDURE — 97112 NEUROMUSCULAR REEDUCATION: CPT | Performed by: OCCUPATIONAL THERAPIST

## 2021-01-13 NOTE — FLOWSHEET NOTE
2 [address] [place], [address] [place] [address] [address]  Phone: [phone] Fax: [phone]    Occupational Therapy Treatment Note/ Progress Report:     Date:  2021    Patient Name:  Elsy Andrews    :  1955  MRN: [id number]    Medical/Treatment Diagnosis Information:  · Diagnosis: S52.591A (ICD-10-CM) - Other closed fracture of distal end of right radius   · Treatment Diagnosis: M25.531 pain right wrist     Insurance/Certification information:  OT Insurance Information: Work Comp  Physician Information:  Referring Practitioner: Dr. Rafael Foss  Has the plan of care been signed (Y/N):        []  Yes  [x]  No     Visit # Insurance Allowable Auth Required   2    []  Yes []  No      Is this a Progress Report:     []  Yes  [x]  No      If Yes:  Date Range for reporting period:  Beginning  Ending    Progress report will be due (10 Rx or 30 days whichever is less): 73     Recertification will be due (POC Duration  / 90 days whichever is less): 3/1/21    Date of Injury: 20  Date of Surgery: 20  Open reduction and internal fixation right distal radius fracture, intra-articular       Date of Patient follow up with Physician: 20     RESTRICTIONS/PRECAUTIONS: Hand therapy referral for a short arm clamshell brace.  Begin early motion of the wrist and include motion of the fingers and elbow.  No strengthening or lifting for 6-8 weeks     Latex Allergy:  []? No      []? Yes                    Pacemaker:  []? No       []? Yes      Preferred Language for Healthcare:   [x]? English       []? other:      Functional Scale: 73% (Quick DASH)                                 Date assessed:  2021     C-SSRS Triggered by Intake questionnaire (Past 2 wk assessment):    No, Questionnaire did not trigger screening.      SUBJECTIVE:  Doing well , still some soreness.      Patient reported deficits/history of current problem: Slipped on the floor and fell on her right arm at work.     Pain Scale: 3/10          []? Constant                [x]? Intermittent              []?other:  Pain Location:  Right wrist  Easing factors: rest  Provocative factors: moving arm around      OBJECTIVE:        1/4/21 1/11/21   AROM: Right right   Digits: tips to DPFC    WNL, slight stiffness infull composite fist    WNL   Thumb MP  IP 0/45  0/35    0/55  0/40   Thumb tip to DPFC To tip of small    Wrist Ext/Flex            RD/UD 30/30  15/30 35/45   Forearm sup/pron    50/45 65/70   Edema: Moderate by visual inspection around wrist    Strength: defer     II      Lateral Pinch      3 Point Pinch      Tip Pinch      MMT:                Date:  1/4/2021 1/13/21    MODALITIES:      Fluidotherapy (89342)      Estim (03835/28604)      Paraffin (40961)      US (54298)      Iontophoresis (85525)      Hot Pack  10' to right wrist    Cold Pack            INTERVENTIONS:      Therapeutic Exercise (16488) AROM right hand, wrist and forearm AROM right hand, wrist and forearm including wrist flex/ext, RD/UD, circumduction, forearm  rotation, thumb flex/ext with emphasis on IP joint flexion as well as full fisting with emphasis on DIP flexion.                             Therapeutic Activity (74124)  wristcier no resistance alternating flex/ext x 7 each direction, 5 sets                            Manual Therapy (31945)                  Neuromuscular Reeducation (16744)  Instruction in correct techniques for maximizing normal motion patterns of hand and wrist                ADL Training (33993)                  HEP Training/Review See sheet(s)                 Splinting      Lcode:      Orthotic Mgmt, Subsequent Enc (30020)      Orthotic Mgmt & Training (44829)            Other:                                Therapeutic Exercise & NMR:  [x] (19737) Provided verbal/tactile cueing for activities related to strengthening, flexibility, endurance, ROM  for improvements in scapular, scapulothoracic and UE control with self care, reaching, carrying, lifting, house/yardwork, driving/computer work. [x] (97507) Provided verbal/tactile cueing for activities related to improving balance, coordination, kinesthetic sense, posture, motor skill, proprioception  to assist with  scapular, scapulothoracic and UE control with self care, reaching, carrying, lifting, house/yardwork, driving/computer work.     Therapeutic Activities & NMR:    [x] (49166 or 85542) Provided verbal/tactile cueing for activities related to improving balance, coordination, kinesthetic sense, posture, motor skill, proprioception and motor activation to allow for proper function of scapular, scapulothoracic and UE control with self care, carrying, lifting, driving/computer work    Home Exercise Program:    [] (07076) Reviewed/Progressed HEP activities related to strengthening, flexibility, endurance, ROM of scapular, scapulothoracic and UE control with self care, reaching, carrying, lifting, house/yardwork, driving/computer work  [] (97477) Reviewed/Progressed HEP activities related to improving balance, coordination, kinesthetic sense, posture, motor skill, proprioception of scapular, scapulothoracic and UE control with self care, reaching, carrying, lifting, house/yardwork, driving/computer work      Manual Treatments:  PROM / STM / Oscillations-Mobs:  G-I, II, III, IV (PA's, Inf., Post.)  [] (36688) Provided manual therapy to mobilize soft tissue/joints of cervical/CT, scapular GHJ and UE for the purpose of modulating pain, promoting relaxation,  increasing ROM, reducing/eliminating soft tissue swelling/inflammation/restriction, improving soft tissue extensibility and allowing for proper ROM for normal function with self care, reaching, carrying, lifting, house/yardwork, driving/computer work    ADL Training:  [] (95472) Provided self-care/home management training related to activities of daily living and compensatory styling hair. []? Progressing: []? Met: []? Not Met: []? Adjusted   4) Pt will demonstrate increased strength to right  60% of left% of uninvolved hand for improved independence with doing normal household tasks and RTW. []? Progressing: []? Met: []? Not Met: []? Adjusted   5) Pt will have a decrease in pain to 2/10 to facilitate return to normal functional use patterns during day. []? Progressing: []? Met: []? Not Met: []? Adjusted      Overall Progression Towards Functional Goals/Treatment Progress Update:  [x] Patient is progressing as expected towards functional goals listed. [] Progression is slowed due to complexities/impairments listed. [] Progression has been slowed due to co-morbidities. [] Plan just implemented, too soon to assess goals progression <30 days  [] Goals require adjustment due to lack of progress  [] Patient is not progressing as expected and requires additional follow up with physician  [] All goals are met  [] Other:     Prognosis for POC: [x] Good [] Fair  [] Poor    Patient requires continued skilled intervention: [x] Yes  [] No    Treatment/Activity Tolerance:  [x] Patient able to complete treatment  [] Patient limited by fatigue  [] Patient limited by pain    [] Patient limited by other medical complications  [] Other:                  PLAN: See eval  [x] Continue per plan of care [] Alter current plan (see comments above)  [] Plan of care initiated [] Hold pending MD visit [] Discharge      Electronically signed by:  Mitch Hackett , OTR\L, T - [id number]    Note: If patient does not return for scheduled/ recommended follow up visits, this note will serve as a discharge from care along with most recent update on progress.

## 2021-01-13 NOTE — PROGRESS NOTES
Chief Complaint   Patient presents with    Post-Op Check     right Wrist: D ORIF SX 12/30       HISTORY OF PRESENT ILLNESS:  Edy Malhotra is a 77 y.o.  patient here for repeat x-ray evaluation after sustaining a significant right hand and wrist injury. Patient is now approximately 2 weeks following ORIF of right distal radius along with carpal tunnel release. Routine follow-up today. She feels that she is doing better. Numbness of the fingers has gone away but she still feels weakness in the hand and wrist.    ROS:  ROS neg     Past medical history is reviewed again today, no changes to report    PHYSICAL EXAMINATION:  Patient is alert and pleasant, in no acute distress. The affected extremity is examined today. Right hand and right wrist reveal nice healing wounds. Swelling is mild to moderate in both hand and wrist.  No sign of infection or dehiscence. Finger and thumb motion is excellent, no triggering. Mild  weakness. Fingers are sensate. Motor movements are present. X-rays:  3 views, right wrist, impression:  Excellent alignment of the distal radius with well-seated hardware. Congruent appearing joint    IMPRESSION AND PLAN: Right distal radius fracture, right carpal tunnel syndrome  Doing well after sustaining a significant right hand and wrist injury. X-rays reveal maintained alignment, patient is doing well clinically also. We will continue with our current care plan. Postoperative wound care was discussed with the patient today. Sutures were removed without difficulty. Steri-Strips were applied (as long as no allergy) to help prevent wound dehiscence. Appropriate monitoring and care of the wound, including cleansing, was outlined with the patient today. We discussed appropriate activity limitations and modifications over the next couple weeks to prevent wound complication, such as dehiscence. The patient understands to contact my office if having wound problems. These would include, but not limited to, erythema, new swelling or pain, dehiscence, signs of infection. Edema control and range of motion of the hand and wrist.  Strengthening after another 6 weeks. I will have the patient follow-up in 6 to 8 weeks unless needed sooner with x-rays of the right wrist.  Patient would like to return to work Monday. She states that she can modify her lifting. We will provide a work note for Monday, would like her wearing her brace at work. All questions and concerns were addressed today. Patient is in agreement with the plan. Elvis Hartman MD  Hand & Upper Extremity Surgery  [address]  A partner of [facility] ([facility])        Please note that this transcription was created using voice recognition software. Any errors are unintentional and may be due to voice recognition transcription.

## 2021-01-20 ENCOUNTER — HOSPITAL ENCOUNTER (OUTPATIENT)
Dept: OCCUPATIONAL THERAPY | Age: 66
Setting detail: THERAPIES SERIES
Discharge: HOME | End: 2021-01-20
Payer: COMMERCIAL

## 2021-01-20 NOTE — FLOWSHEET NOTE
[employer]      Occupational Therapy  Cancellation/No-show Note  Patient Name:  Erinn Bowles   :  1955   Date:  2021  Cancelled visits to date: 0  No-shows to date: 1    For today's appointment patient:  []    Cancelled  []    Rescheduled appointment  []    No-show     Reason given by patient:  []    Patient ill  []    Conflicting appointment  []    No transportation    []    Conflict with work  []    No reason given  [x]    Other:  Called and left message inquiring about her status and recommending she call for further follow-up as needed.    Comments:      Electronically signed by:  Irais Carrasquillo, OT

## 2021-02-12 ENCOUNTER — TELEPHONE (OUTPATIENT)
Dept: ORTHOPEDIC SURGERY | Age: 66
End: 2021-02-12

## 2024-04-04 ENCOUNTER — APPOINTMENT (OUTPATIENT)
Dept: GENERAL RADIOLOGY | Age: 69
End: 2024-04-04
Payer: MEDICARE

## 2024-04-04 ENCOUNTER — HOSPITAL ENCOUNTER (EMERGENCY)
Age: 69
Discharge: HOME | End: 2024-04-04
Payer: MEDICARE

## 2024-04-04 VITALS
SYSTOLIC BLOOD PRESSURE: 165 MMHG | BODY MASS INDEX: 34.04 KG/M2 | DIASTOLIC BLOOD PRESSURE: 82 MMHG | OXYGEN SATURATION: 98 % | RESPIRATION RATE: 15 BRPM | HEIGHT: 62 IN | WEIGHT: 185 LBS | TEMPERATURE: 98.1 F | HEART RATE: 80 BPM

## 2024-04-04 DIAGNOSIS — Z20.822 LAB TEST NEGATIVE FOR COVID-19 VIRUS: ICD-10-CM

## 2024-04-04 DIAGNOSIS — I10 ELEVATED BLOOD PRESSURE READING IN OFFICE WITH DIAGNOSIS OF HYPERTENSION: ICD-10-CM

## 2024-04-04 DIAGNOSIS — J06.9 VIRAL UPPER RESPIRATORY TRACT INFECTION WITH COUGH: Primary | ICD-10-CM

## 2024-04-04 LAB
FLUAV RNA RESP QL NAA+PROBE: NOT DETECTED
FLUBV RNA RESP QL NAA+PROBE: NOT DETECTED
S PYO AG THROAT QL: NEGATIVE
SARS-COV-2 RNA RESP QL NAA+PROBE: NOT DETECTED

## 2024-04-04 PROCEDURE — 87081 CULTURE SCREEN ONLY: CPT

## 2024-04-04 PROCEDURE — 6370000000 HC RX 637 (ALT 250 FOR IP): Performed by: NURSE PRACTITIONER

## 2024-04-04 PROCEDURE — 99284 EMERGENCY DEPT VISIT MOD MDM: CPT

## 2024-04-04 PROCEDURE — 87636 SARSCOV2 & INF A&B AMP PRB: CPT

## 2024-04-04 PROCEDURE — 87880 STREP A ASSAY W/OPTIC: CPT

## 2024-04-04 PROCEDURE — 71046 X-RAY EXAM CHEST 2 VIEWS: CPT

## 2024-04-04 RX ORDER — IBUPROFEN 600 MG/1
600 TABLET ORAL 3 TIMES DAILY PRN
Qty: 15 TABLET | Refills: 0 | Status: SHIPPED | OUTPATIENT
Start: 2024-04-04 | End: 2024-04-09

## 2024-04-04 RX ORDER — BENZONATATE 100 MG/1
100 CAPSULE ORAL 3 TIMES DAILY PRN
Qty: 15 CAPSULE | Refills: 0 | Status: SHIPPED | OUTPATIENT
Start: 2024-04-04 | End: 2024-04-11

## 2024-04-04 RX ORDER — BENZONATATE 100 MG/1
100 CAPSULE ORAL ONCE
Status: COMPLETED | OUTPATIENT
Start: 2024-04-04 | End: 2024-04-04

## 2024-04-04 RX ORDER — BENZOCAINE/MENTH/CETYLPYRD CL 15 MG-2 MG
1 LOZENGE MUCOUS MEMBRANE
Qty: 30 LOZENGE | Refills: 0 | Status: SHIPPED | OUTPATIENT
Start: 2024-04-04 | End: 2024-04-09

## 2024-04-04 RX ORDER — ACETAMINOPHEN 500 MG
500 TABLET ORAL 4 TIMES DAILY PRN
Qty: 28 TABLET | Refills: 0 | Status: SHIPPED | OUTPATIENT
Start: 2024-04-04 | End: 2024-04-11

## 2024-04-04 RX ORDER — ALBUTEROL SULFATE 90 UG/1
2 AEROSOL, METERED RESPIRATORY (INHALATION) 4 TIMES DAILY PRN
Qty: 18 G | Refills: 0 | Status: SHIPPED | OUTPATIENT
Start: 2024-04-04

## 2024-04-04 RX ADMIN — BENZONATATE 100 MG: 100 CAPSULE ORAL at 19:20

## 2024-04-04 ASSESSMENT — PAIN SCALES - GENERAL: PAINLEVEL_OUTOF10: 6

## 2024-04-04 ASSESSMENT — LIFESTYLE VARIABLES
HOW MANY STANDARD DRINKS CONTAINING ALCOHOL DO YOU HAVE ON A TYPICAL DAY: 1 OR 2
HOW OFTEN DO YOU HAVE A DRINK CONTAINING ALCOHOL: MONTHLY OR LESS

## 2024-04-04 ASSESSMENT — PAIN DESCRIPTION - LOCATION: LOCATION: CHEST

## 2024-04-04 ASSESSMENT — PAIN DESCRIPTION - PAIN TYPE: TYPE: ACUTE PAIN

## 2024-04-04 ASSESSMENT — PAIN - FUNCTIONAL ASSESSMENT: PAIN_FUNCTIONAL_ASSESSMENT: 0-10

## 2024-04-04 ASSESSMENT — PAIN DESCRIPTION - ORIENTATION: ORIENTATION: RIGHT;LEFT;MID

## 2024-04-04 ASSESSMENT — PAIN DESCRIPTION - DESCRIPTORS: DESCRIPTORS: ACHING

## 2024-04-05 NOTE — DISCHARGE INSTRUCTIONS
Return to the emergency room for new worsening symptoms including, limited to, developing fever, chills, sweats, inability to tolerate food or drink, worsening/severe shortness of breath, or other symptoms/concerns.    Medication as prescribed.  Eat before taking ibuprofen.    Drink plenty of fluids and get plenty of rest.    Your blood pressure readings here today were very elevated.  Ensure that you take your antihypertensive medications as prescribed without missing doses.  Use dietary modifications such as a Dash or Mediterranean diet as listed above.    Follow with your primary care provider for reevaluation next 1-2 days with the physician referral service line or to establish PCP.  Future nonemergent medical needs.

## 2024-04-05 NOTE — ED PROVIDER NOTES
[facility]  ED  EMERGENCY DEPARTMENT ENCOUNTER        Pt Name: Katlin Granda  MRN: [id number]  Birthdate 1955  Date of evaluation: 2024  Provider: ROSALIE Sorensen CNP  PCP: No primary care provider on file.  Note Started: 5:10 AM EDT 24      {Shared Not Shared:17144}      CHIEF COMPLAINT       Chief Complaint   Patient presents with    Cough     Pt states she thinks she has pneumonia. Reports dry hacking cough, fevers, chills, sweats, throat pain, ear pain, headaches, sneezing, head congestion. States this has been going on since Monday. Went to urgent care, and was told she needs to come to ED. Been taking mucinex bid since Monday.        HISTORY OF PRESENT ILLNESS: 1 or more Elements     {History from (Optional):86178}    {Limitations to history (Optional):04903}    Katlin Granda is a 68 y.o. female who presents ***    Nursing Notes were all reviewed and agreed with or any disagreements were addressed in the HPI.    REVIEW OF SYSTEMS :      Review of Systems    Positives and Pertinent negatives as per HPI.     SURGICAL HISTORY     Past Surgical History:   Procedure Laterality Date    FOREARM SURGERY Right 2020    RIGHT DISTAL RADIUS OPEN REDUCTION INTERNAL FIXATION performed by Raimundo Day MD at [facility]       [facility]       Discharge Medication List as of 2024  8:33 PM        CONTINUE these medications which have NOT CHANGED    Details   metoprolol (LOPRESSOR) 50 MG tablet Take 50 mg by mouth 2 times daily.             ALLERGIES     Patient has no known allergies.    FAMILYHISTORY     History reviewed. No pertinent family history.     SOCIAL HISTORY       Social History     Tobacco Use    Smoking status: Former     Current packs/day: 0.00     Types: Cigarettes     Quit date: 2014     Years since quittin.3    Smokeless tobacco: Never   Substance Use Topics    Alcohol use: Yes     Comment: occassionally    Drug

## 2024-04-06 LAB — S PYO THROAT QL CULT: NORMAL

## 2024-04-08 ASSESSMENT — ENCOUNTER SYMPTOMS
VOMITING: 0
EYE PAIN: 0
DIARRHEA: 0
ABDOMINAL PAIN: 0
SHORTNESS OF BREATH: 1
SORE THROAT: 0
ANAL BLEEDING: 0
NAUSEA: 0
COUGH: 1

## 2025-06-02 ENCOUNTER — APPOINTMENT (OUTPATIENT)
Dept: GENERAL RADIOLOGY | Age: 70
End: 2025-06-02
Payer: MEDICARE

## 2025-06-02 ENCOUNTER — HOSPITAL ENCOUNTER (EMERGENCY)
Age: 70
Discharge: HOME | End: 2025-06-02
Payer: MEDICARE

## 2025-06-02 VITALS
TEMPERATURE: 99.1 F | DIASTOLIC BLOOD PRESSURE: 77 MMHG | SYSTOLIC BLOOD PRESSURE: 131 MMHG | HEART RATE: 87 BPM | WEIGHT: 203 LBS | OXYGEN SATURATION: 96 % | RESPIRATION RATE: 18 BRPM | HEIGHT: 62 IN | BODY MASS INDEX: 37.36 KG/M2

## 2025-06-02 DIAGNOSIS — B34.9 VIRAL SYNDROME: Primary | ICD-10-CM

## 2025-06-02 LAB
ALBUMIN SERPL-MCNC: 3.7 G/DL (ref 3.4–5)
ALBUMIN/GLOB SERPL: 1.3 {RATIO} (ref 1.1–2.2)
ALP SERPL-CCNC: 88 U/L (ref 40–129)
ALT SERPL-CCNC: 18 U/L (ref 10–40)
ANION GAP SERPL CALCULATED.3IONS-SCNC: 9 MMOL/L (ref 3–16)
AST SERPL-CCNC: 25 U/L (ref 15–37)
BACTERIA URNS QL MICRO: ABNORMAL /HPF
BASOPHILS # BLD: 0.1 K/UL (ref 0–0.2)
BASOPHILS NFR BLD: 0.8 %
BILIRUB SERPL-MCNC: 0.4 MG/DL (ref 0–1)
BILIRUB UR QL STRIP.AUTO: NEGATIVE
BUN SERPL-MCNC: 10 MG/DL (ref 7–20)
CALCIUM SERPL-MCNC: 8.6 MG/DL (ref 8.3–10.6)
CHLORIDE SERPL-SCNC: 103 MMOL/L (ref 99–110)
CLARITY UR: CLEAR
CO2 SERPL-SCNC: 25 MMOL/L (ref 21–32)
COLOR UR: YELLOW
CREAT SERPL-MCNC: 0.7 MG/DL (ref 0.6–1.2)
DEPRECATED RDW RBC AUTO: 13.1 % (ref 12.4–15.4)
EOSINOPHIL # BLD: 0 K/UL (ref 0–0.6)
EOSINOPHIL NFR BLD: 0 %
EPI CELLS #/AREA URNS HPF: ABNORMAL /HPF (ref 0–5)
FLUAV RNA RESP QL NAA+PROBE: NOT DETECTED
FLUBV RNA RESP QL NAA+PROBE: NOT DETECTED
GFR SERPLBLD CREATININE-BSD FMLA CKD-EPI: >90 ML/MIN/{1.73_M2}
GLUCOSE SERPL-MCNC: 122 MG/DL (ref 70–99)
GLUCOSE UR STRIP.AUTO-MCNC: NEGATIVE MG/DL
HCT VFR BLD AUTO: 41 % (ref 36–48)
HGB BLD-MCNC: 14.1 G/DL (ref 12–16)
HGB UR QL STRIP.AUTO: ABNORMAL
KETONES UR STRIP.AUTO-MCNC: NEGATIVE MG/DL
LEUKOCYTE ESTERASE UR QL STRIP.AUTO: ABNORMAL
LYMPHOCYTES # BLD: 0.6 K/UL (ref 1–5.1)
LYMPHOCYTES NFR BLD: 7.3 %
MCH RBC QN AUTO: 29.6 PG (ref 26–34)
MCHC RBC AUTO-ENTMCNC: 34.3 G/DL (ref 31–36)
MCV RBC AUTO: 86.5 FL (ref 80–100)
MONOCYTES # BLD: 0.5 K/UL (ref 0–1.3)
MONOCYTES NFR BLD: 6.2 %
NEUTROPHILS # BLD: 7.2 K/UL (ref 1.7–7.7)
NEUTROPHILS NFR BLD: 85.7 %
NITRITE UR QL STRIP.AUTO: NEGATIVE
PH UR STRIP.AUTO: 6 [PH] (ref 5–8)
PLATELET # BLD AUTO: 257 K/UL (ref 135–450)
PMV BLD AUTO: 6.6 FL (ref 5–10.5)
POTASSIUM SERPL-SCNC: 3.7 MMOL/L (ref 3.5–5.1)
PROT SERPL-MCNC: 6.6 G/DL (ref 6.4–8.2)
PROT UR STRIP.AUTO-MCNC: NEGATIVE MG/DL
RBC # BLD AUTO: 4.75 M/UL (ref 4–5.2)
RBC #/AREA URNS HPF: ABNORMAL /HPF (ref 0–4)
SARS-COV-2 RNA RESP QL NAA+PROBE: NOT DETECTED
SODIUM SERPL-SCNC: 137 MMOL/L (ref 136–145)
SP GR UR STRIP.AUTO: 1.01 (ref 1–1.03)
UA COMPLETE W REFLEX CULTURE PNL UR: YES
UA DIPSTICK W REFLEX MICRO PNL UR: YES
URN SPEC COLLECT METH UR: ABNORMAL
UROBILINOGEN UR STRIP-ACNC: 0.2 E.U./DL
WBC # BLD AUTO: 8.4 K/UL (ref 4–11)
WBC #/AREA URNS HPF: ABNORMAL /HPF (ref 0–5)

## 2025-06-02 PROCEDURE — 87077 CULTURE AEROBIC IDENTIFY: CPT

## 2025-06-02 PROCEDURE — 87186 SC STD MICRODIL/AGAR DIL: CPT

## 2025-06-02 PROCEDURE — 71045 X-RAY EXAM CHEST 1 VIEW: CPT

## 2025-06-02 PROCEDURE — 87184 SC STD DISK METHOD PER PLATE: CPT

## 2025-06-02 PROCEDURE — 87636 SARSCOV2 & INF A&B AMP PRB: CPT

## 2025-06-02 PROCEDURE — 80053 COMPREHEN METABOLIC PANEL: CPT

## 2025-06-02 PROCEDURE — 85025 COMPLETE CBC W/AUTO DIFF WBC: CPT

## 2025-06-02 PROCEDURE — 99284 EMERGENCY DEPT VISIT MOD MDM: CPT

## 2025-06-02 PROCEDURE — 81001 URINALYSIS AUTO W/SCOPE: CPT

## 2025-06-02 PROCEDURE — 87086 URINE CULTURE/COLONY COUNT: CPT

## 2025-06-02 ASSESSMENT — PAIN SCALES - GENERAL: PAINLEVEL_OUTOF10: 4

## 2025-06-02 ASSESSMENT — PAIN - FUNCTIONAL ASSESSMENT: PAIN_FUNCTIONAL_ASSESSMENT: 0-10

## 2025-06-02 NOTE — ED PROVIDER NOTES
Addendum: I received a lab result in my inbox for the patient's urine culture.  Patient cultured for Escherichia Coli.  Sensitivity is not back at this time however, should be susceptible to cephalexin.  Patient was sent in a prescription for cephalexin 500 mg 3 times daily for 7 days.  Nursing staff to inform the patient.            [facility] EMERGENCY DEPARTMENT  EMERGENCY DEPARTMENT ENCOUNTER        Pt Name: Katlin Granda  MRN: [id number]  Birthdate 1955  Date of evaluation: 6/2/2025  Provider: MARQUES Choe Jr  PCP: No primary care provider on file.  Note Started: 9:05 AM EDT 6/2/25      JESSE. I have evaluated this patient.        CHIEF COMPLAINT       Chief Complaint   Patient presents with    Fever     Yesterday the patient felt feverish with body aches and nausea. Reports low energy and chills.        HISTORY OF PRESENT ILLNESS: 1 or more Elements     History from : Patient    Limitations to history : None    Katlin Granda is a 69 y.o. female who presents with reports of bodyaches fevers and chills that started yesterday.  She did take ibuprofen and felt that her feverish feeling was breaking.  She did not check her temperature.  States that she woke up this morning and still felt ill so wanted to come in for evaluation.  She is not having any chest pain, shortness of breath, coughing.  She has not had any abdominal pain.  Does endorse mild nausea but has not vomited.  She has no other complaints at this time.  Review of systems otherwise negative.    Nursing Notes were all reviewed and agreed with or any disagreements were addressed in the HPI.      SURGICAL HISTORY     Past Surgical History:   Procedure Laterality Date    FOREARM SURGERY Right 12/30/2020    RIGHT DISTAL RADIUS OPEN REDUCTION INTERNAL FIXATION performed by Raimundo Day MD at [facility]       [facility]       Discharge Medication List as of 6/2/2025 11:20 AM        CONTINUE these

## 2025-06-04 RX ORDER — CEPHALEXIN 500 MG/1
500 CAPSULE ORAL 3 TIMES DAILY
Qty: 21 CAPSULE | Refills: 0 | Status: SHIPPED | OUTPATIENT
Start: 2025-06-04 | End: 2025-06-11

## 2025-06-05 ENCOUNTER — RESULTS FOLLOW-UP (OUTPATIENT)
Dept: EMERGENCY DEPT | Age: 70
End: 2025-06-05

## 2025-06-05 LAB
BACTERIA UR CULT: ABNORMAL
ORGANISM: ABNORMAL

## 2025-08-17 ENCOUNTER — HOSPITAL ENCOUNTER (EMERGENCY)
Age: 70
Discharge: HOME | End: 2025-08-17
Payer: MEDICARE

## 2025-08-17 ENCOUNTER — APPOINTMENT (OUTPATIENT)
Dept: GENERAL RADIOLOGY | Age: 70
End: 2025-08-17
Payer: MEDICARE

## 2025-08-17 ENCOUNTER — APPOINTMENT (OUTPATIENT)
Dept: CT IMAGING | Age: 70
End: 2025-08-17
Payer: MEDICARE

## 2025-08-17 VITALS
SYSTOLIC BLOOD PRESSURE: 169 MMHG | WEIGHT: 200.13 LBS | HEIGHT: 60 IN | OXYGEN SATURATION: 97 % | DIASTOLIC BLOOD PRESSURE: 78 MMHG | RESPIRATION RATE: 18 BRPM | TEMPERATURE: 98.2 F | HEART RATE: 81 BPM | BODY MASS INDEX: 39.29 KG/M2

## 2025-08-17 DIAGNOSIS — S09.90XA CLOSED HEAD INJURY, INITIAL ENCOUNTER: Primary | ICD-10-CM

## 2025-08-17 DIAGNOSIS — M54.2 NECK PAIN: ICD-10-CM

## 2025-08-17 DIAGNOSIS — M25.561 ACUTE PAIN OF RIGHT KNEE: ICD-10-CM

## 2025-08-17 DIAGNOSIS — M54.41 ACUTE RIGHT-SIDED LOW BACK PAIN WITH RIGHT-SIDED SCIATICA: ICD-10-CM
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
## 2025-08-17 DIAGNOSIS — N30.00 ACUTE CYSTITIS WITHOUT HEMATURIA: ICD-10-CM

## 2025-08-17 LAB
AMPHETAMINES UR QL SCN>1000 NG/ML: ABNORMAL
BACTERIA URNS QL MICRO: ABNORMAL /HPF
BARBITURATES UR QL SCN>200 NG/ML: ABNORMAL
BENZODIAZ UR QL SCN>200 NG/ML: ABNORMAL
BILIRUB UR QL STRIP.AUTO: NEGATIVE
CANNABINOIDS UR QL SCN>50 NG/ML: ABNORMAL
CLARITY UR: CLEAR
COCAINE UR QL SCN: ABNORMAL
COLOR UR: YELLOW
DRUG SCREEN COMMENT UR-IMP: ABNORMAL
FENTANYL SCREEN, URINE: ABNORMAL
GLUCOSE UR STRIP.AUTO-MCNC: NEGATIVE MG/DL
HGB UR QL STRIP.AUTO: ABNORMAL
KETONES UR STRIP.AUTO-MCNC: NEGATIVE MG/DL
LEUKOCYTE ESTERASE UR QL STRIP.AUTO: ABNORMAL
METHADONE UR QL SCN>300 NG/ML: ABNORMAL
NITRITE UR QL STRIP.AUTO: POSITIVE
OPIATES UR QL SCN>300 NG/ML: ABNORMAL
OXYCODONE UR QL SCN: POSITIVE
PCP UR QL SCN>25 NG/ML: ABNORMAL
PH UR STRIP.AUTO: 6 [PH] (ref 5–8)
PH UR STRIP: 6 [PH]
PROT UR STRIP.AUTO-MCNC: NEGATIVE MG/DL
RBC #/AREA URNS HPF: ABNORMAL /HPF (ref 0–4)
SP GR UR STRIP.AUTO: 1.01 (ref 1–1.03)
UA DIPSTICK W REFLEX MICRO PNL UR: YES
URN SPEC COLLECT METH UR: ABNORMAL
UROBILINOGEN UR STRIP-ACNC: 0.2 E.U./DL
WBC #/AREA URNS HPF: ABNORMAL /HPF (ref 0–5)

## 2025-08-17 PROCEDURE — 72125 CT NECK SPINE W/O DYE: CPT

## 2025-08-17 PROCEDURE — 99284 EMERGENCY DEPT VISIT MOD MDM: CPT

## 2025-08-17 PROCEDURE — 73560 X-RAY EXAM OF KNEE 1 OR 2: CPT

## 2025-08-17 PROCEDURE — 81001 URINALYSIS AUTO W/SCOPE: CPT

## 2025-08-17 PROCEDURE — 80307 DRUG TEST PRSMV CHEM ANLYZR: CPT

## 2025-08-17 PROCEDURE — 70450 CT HEAD/BRAIN W/O DYE: CPT

## 2025-08-17 PROCEDURE — 6370000000 HC RX 637 (ALT 250 FOR IP): Performed by: PHYSICIAN ASSISTANT

## 2025-08-17 PROCEDURE — 73502 X-RAY EXAM HIP UNI 2-3 VIEWS: CPT

## 2025-08-17 RX ORDER — CEFUROXIME AXETIL 250 MG/1
250 TABLET ORAL 2 TIMES DAILY
Qty: 14 TABLET | Refills: 0 | Status: SHIPPED | OUTPATIENT
Start: 2025-08-17 | End: 2025-08-24

## 2025-08-17 RX ORDER — PREDNISONE 50 MG/1
50 TABLET ORAL DAILY
Qty: 5 TABLET | Refills: 0 | Status: SHIPPED | OUTPATIENT
Start: 2025-08-17 | End: 2025-08-22

## 2025-08-17 RX ORDER — CEFUROXIME AXETIL 250 MG/1
250 TABLET ORAL EVERY 12 HOURS SCHEDULED
Status: DISCONTINUED | OUTPATIENT
Start: 2025-08-17 | End: 2025-08-17 | Stop reason: HOSPADM

## 2025-08-17 RX ADMIN — CEFUROXIME AXETIL 250 MG: 250 TABLET, FILM COATED ORAL at 20:42

## 2025-08-17 ASSESSMENT — PAIN SCALES - GENERAL
PAINLEVEL_OUTOF10: 7
PAINLEVEL_OUTOF10: 6

## 2025-08-17 ASSESSMENT — PAIN - FUNCTIONAL ASSESSMENT: PAIN_FUNCTIONAL_ASSESSMENT: 0-10

## 2025-08-17 ASSESSMENT — PAIN DESCRIPTION - ORIENTATION
ORIENTATION: RIGHT
ORIENTATION: RIGHT

## 2025-08-17 ASSESSMENT — PAIN DESCRIPTION - LOCATION
LOCATION: HEAD;NECK;KNEE
LOCATION: HEAD;NECK;KNEE

## (undated) DEVICE — SUTURE ETHLN SZ 4-0 L18IN NONABSORBABLE BLK L19MM PS-2 3/8 1667H

## (undated) DEVICE — STERILE LATEX POWDER-FREE SURGICAL GLOVESWITH NITRILE COATING: Brand: PROTEXIS

## (undated) DEVICE — BANDAGE COBAN 4 IN COMPR W4INXL5YD FOAM COHESIVE QUIK STK SELF ADH SFT

## (undated) DEVICE — DRAPE EQUIP C ARM MINI 10000100] TIDI PRODUCTS INC]

## (undated) DEVICE — SOLUTION,SALINE,IRRGATION,500ML,STRL: Brand: MEDLINE

## (undated) DEVICE — SUTURE COAT VCRL SZ 4-0 L18IN ABSRB UD L19MM PS-2 1/2 CIR J496G

## (undated) DEVICE — ROYAL SILK SURGICAL GOWN, XL: Brand: CONVERTORS

## (undated) DEVICE — COTTON UNDERCAST PADDING,CRIMPED FINISH: Brand: WEBRIL

## (undated) DEVICE — SPLINT ORTH W3XL12IN LAYERED FBRGLS FOAM PD BRTH BK MOLD

## (undated) DEVICE — Z INACTIVE NO SUPPLIER IDENTIFIED BANDAGE COMPR W3INX5YD SELF CLSR FLAP E SHUR BAND